# Patient Record
Sex: FEMALE | Race: WHITE
[De-identification: names, ages, dates, MRNs, and addresses within clinical notes are randomized per-mention and may not be internally consistent; named-entity substitution may affect disease eponyms.]

---

## 2018-01-17 ENCOUNTER — HOSPITAL ENCOUNTER (OUTPATIENT)
Dept: HOSPITAL 89 - US | Age: 83
End: 2018-01-17
Attending: PHYSICIAN ASSISTANT
Payer: MEDICARE

## 2018-01-17 VITALS — BODY MASS INDEX: 28.4 KG/M2

## 2018-01-17 DIAGNOSIS — Z90.49: ICD-10-CM

## 2018-01-17 DIAGNOSIS — K42.9: ICD-10-CM

## 2018-01-17 DIAGNOSIS — I70.8: ICD-10-CM

## 2018-01-17 DIAGNOSIS — L98.6: ICD-10-CM

## 2018-01-17 DIAGNOSIS — K44.9: Primary | ICD-10-CM

## 2018-01-17 DIAGNOSIS — N28.1: ICD-10-CM

## 2018-01-17 DIAGNOSIS — R93.3: ICD-10-CM

## 2018-01-17 DIAGNOSIS — Z90.710: ICD-10-CM

## 2018-01-17 DIAGNOSIS — M47.817: ICD-10-CM

## 2018-01-17 PROCEDURE — 74177 CT ABD & PELVIS W/CONTRAST: CPT

## 2018-01-17 PROCEDURE — 76705 ECHO EXAM OF ABDOMEN: CPT

## 2018-01-17 NOTE — RADIOLOGY IMAGING REPORT
FACILITY: [facility] - [place] 

 

PATIENT NAME: Vivienne Arrington

: 1930

MR: [id number]

V: [id number]

EXAM DATE: 

ORDERING PHYSICIAN: KUMAR VIVAS

TECHNOLOGIST: 

 

Location: [facility] [place]

Patient: Vivienne Arrington

: 1930

MRN: [id number]

Visit/Account:[id number]

Date of Sevice:  2018

 

ACCESSION #: [id number]

 

ABD SINGLE ORGAN/QUAD/FOLLOWUP

 

HISTORY:  Right lower quadrant pain rule out hernia at incision line closed umbilicus

 

COMPARISON:  CT and pelvis May 11, 2015

 

FINDINGS:

 

Multiple sonographic images of the right lower quadrant were submitted in location of patient's pain.
  The images were performed both with and without a Valsalva maneuver.  There was no demonstration of
 a ventral hernia.  No abnormal fluid collections identified in the subcutaneous soft tissues.  No so
lid mass is demonstrated in this location

 

IMPRESSION:

No sonographic abnormality identified along the intra-abdominal wall the right lower quadrant

 

Report Dictated By: Yakelin Tian MD at 2018 11:12 AM

 

Report E-Signed By: Yakelin Tian MD  at 2018 11:15 AM

 

WSN:[id number]

## 2018-01-17 NOTE — RADIOLOGY IMAGING REPORT
FACILITY: [facility] 

 

PATIENT NAME: Vivienne Arrington

: 1930

MR: [id number]

V: [id number]

EXAM DATE: 

ORDERING PHYSICIAN: KUMAR VIVAS

TECHNOLOGIST: 

 

Location: [facility] - [place]

Patient: Vivienne Arrington

: 1930

MRN: [id number]

Visit/Account:[id number]

Date of Sevice:  2018

 

ACCESSION #: [id number]

 

ABDOMEN/PELVIS WITH CONTRAST

 

HISTORY:  Right lower quadrant pain

 

TECHNIQUE: Following administration of IV contrast contiguous axial images acquired through the abdom
en/pelvis.  Coronal and sagittal reformatting also performed. Dose Lowering Technique

 

One of the following dose optimization techniques was utilized in the performance of this exam: Autom
ated exposure control; adjustment of the mA and/or kV according to the patient's size; or use of an i
terative  reconstruction technique.  Specific details can be referenced in the facility's radiology C
T exam operational policy.

 

 

 

CONTRAST:  75 mL Isovue-370

 

COMPARISON:  CT abdomen pelvis May 11, 2015

 

FINDINGS:

 

Visualized lung bases:  Negative.

 

Hepatobiliary:  There are postsurgical changes from a cholecystectomy.  There is a small area of decr
eased attenuation seen along the inferior liver adjacent to the gallbladder which appears some are to
 the prior study and likely represents an area of focal fatty infiltration

 

Spleen:  Negative.

 

Adrenals:  Negative.

 

Pancreas:  Negative.

 

Kidneys ureters or bladder: There Is a 3.1 cm cyst lower pole the right kidney.  Tiny hypodensity low
er pole of the left kidney is too small to characterize.  There is mild perinephric stranding bilater
ally.

 

Genitalia:  Hysterectomy

 

GI:  There are postsurgical changes of the sigmoid colon..  Post surgical changes also noted at the c
ecum  .  There is a small hiatal hernia

 

Vessels/spaces/nodes:  Extensive vascular calcifications in the abdomen and pelvis

 

Bones/soft tissues:  There is a very small umbilical hernia containing fat that appears unchanged whe
n compared to the prior study.  Calcifications are identified in the rectus sheath to the right of mi
dline above the level of the umbilicus.  Minimal infiltrative changes are seen subcutaneous fat  .

 

There are spondylotic changes of the lumbar spine including bulging discs from L1 to S1

 

Additional findings:  None pertinent.

 

IMPRESSION:

 

Is a tiny umbilical hernia containing fat that appears unchanged when compared the prior study.  Ther
e are minimal infiltrative changes seen in in the subcutaneous fat and calcifications in the rectus s
francesca just to the right and superior to the umbilicus.  This was present on the prior study with the 
infiltrative changes appearing much less prominent.

 

Postsurgical changes from a cholecystectomy and hysterectomy

 

Postsurgical changes of the sigmoid colon and cecum

 

Small hiatal hernia

 

 

 

Report Dictated By: Yakelin Tian MD at 2018 12:06 PM

 

Report E-Signed By: Yakelin Tian MD  at 2018 12:17 PM

 

WSN:[email or web address]

## 2018-03-13 ENCOUNTER — HOSPITAL ENCOUNTER (INPATIENT)
Dept: HOSPITAL 89 - ER | Age: 83
LOS: 5 days | Discharge: HOME | DRG: 390 | End: 2018-03-18
Attending: SURGERY | Admitting: SURGERY
Payer: MEDICARE

## 2018-03-13 ENCOUNTER — HOSPITAL ENCOUNTER (EMERGENCY)
Dept: HOSPITAL 89 - ER | Age: 83
Discharge: HOME | End: 2018-03-13
Payer: MEDICARE

## 2018-03-13 VITALS
WEIGHT: 170.37 LBS | HEIGHT: 65.5 IN | WEIGHT: 170.37 LBS | HEIGHT: 65.5 IN | BODY MASS INDEX: 28.04 KG/M2 | BODY MASS INDEX: 28.04 KG/M2

## 2018-03-13 VITALS — DIASTOLIC BLOOD PRESSURE: 72 MMHG | SYSTOLIC BLOOD PRESSURE: 140 MMHG

## 2018-03-13 VITALS — BODY MASS INDEX: 28.4 KG/M2 | WEIGHT: 170 LBS

## 2018-03-13 VITALS — DIASTOLIC BLOOD PRESSURE: 69 MMHG | SYSTOLIC BLOOD PRESSURE: 133 MMHG

## 2018-03-13 DIAGNOSIS — R10.84: Primary | ICD-10-CM

## 2018-03-13 DIAGNOSIS — Z90.49: ICD-10-CM

## 2018-03-13 DIAGNOSIS — Z88.8: ICD-10-CM

## 2018-03-13 DIAGNOSIS — Z90.710: ICD-10-CM

## 2018-03-13 DIAGNOSIS — E78.5: ICD-10-CM

## 2018-03-13 DIAGNOSIS — E03.9: ICD-10-CM

## 2018-03-13 DIAGNOSIS — Z87.891: ICD-10-CM

## 2018-03-13 DIAGNOSIS — Z85.828: ICD-10-CM

## 2018-03-13 DIAGNOSIS — Z85.3: ICD-10-CM

## 2018-03-13 DIAGNOSIS — Z88.0: ICD-10-CM

## 2018-03-13 DIAGNOSIS — I10: ICD-10-CM

## 2018-03-13 DIAGNOSIS — K56.50: Primary | ICD-10-CM

## 2018-03-13 LAB
INR PPP: 1.08
PLATELET COUNT, AUTOMATED: 209 K/UL (ref 150–450)
PLATELET COUNT, AUTOMATED: 231 K/UL (ref 150–450)

## 2018-03-13 PROCEDURE — 74250 X-RAY XM SM INT 1CNTRST STD: CPT

## 2018-03-13 PROCEDURE — 84075 ASSAY ALKALINE PHOSPHATASE: CPT

## 2018-03-13 PROCEDURE — 84520 ASSAY OF UREA NITROGEN: CPT

## 2018-03-13 PROCEDURE — 71046 X-RAY EXAM CHEST 2 VIEWS: CPT

## 2018-03-13 PROCEDURE — 84295 ASSAY OF SERUM SODIUM: CPT

## 2018-03-13 PROCEDURE — 84450 TRANSFERASE (AST) (SGOT): CPT

## 2018-03-13 PROCEDURE — 82435 ASSAY OF BLOOD CHLORIDE: CPT

## 2018-03-13 PROCEDURE — 36415 COLL VENOUS BLD VENIPUNCTURE: CPT

## 2018-03-13 PROCEDURE — 74177 CT ABD & PELVIS W/CONTRAST: CPT

## 2018-03-13 PROCEDURE — 82247 BILIRUBIN TOTAL: CPT

## 2018-03-13 PROCEDURE — 81001 URINALYSIS AUTO W/SCOPE: CPT

## 2018-03-13 PROCEDURE — 83690 ASSAY OF LIPASE: CPT

## 2018-03-13 PROCEDURE — 82150 ASSAY OF AMYLASE: CPT

## 2018-03-13 PROCEDURE — 85610 PROTHROMBIN TIME: CPT

## 2018-03-13 PROCEDURE — 82947 ASSAY GLUCOSE BLOOD QUANT: CPT

## 2018-03-13 PROCEDURE — 82310 ASSAY OF CALCIUM: CPT

## 2018-03-13 PROCEDURE — 96374 THER/PROPH/DIAG INJ IV PUSH: CPT

## 2018-03-13 PROCEDURE — 82040 ASSAY OF SERUM ALBUMIN: CPT

## 2018-03-13 PROCEDURE — 82374 ASSAY BLOOD CARBON DIOXIDE: CPT

## 2018-03-13 PROCEDURE — 84155 ASSAY OF PROTEIN SERUM: CPT

## 2018-03-13 PROCEDURE — 85025 COMPLETE CBC W/AUTO DIFF WBC: CPT

## 2018-03-13 PROCEDURE — 84132 ASSAY OF SERUM POTASSIUM: CPT

## 2018-03-13 PROCEDURE — 74022 RADEX COMPL AQT ABD SERIES: CPT

## 2018-03-13 PROCEDURE — 82565 ASSAY OF CREATININE: CPT

## 2018-03-13 PROCEDURE — 99285 EMERGENCY DEPT VISIT HI MDM: CPT

## 2018-03-13 PROCEDURE — 96360 HYDRATION IV INFUSION INIT: CPT

## 2018-03-13 PROCEDURE — 96375 TX/PRO/DX INJ NEW DRUG ADDON: CPT

## 2018-03-13 PROCEDURE — 84460 ALANINE AMINO (ALT) (SGPT): CPT

## 2018-03-13 PROCEDURE — 99284 EMERGENCY DEPT VISIT MOD MDM: CPT

## 2018-03-13 PROCEDURE — 83605 ASSAY OF LACTIC ACID: CPT

## 2018-03-13 PROCEDURE — 93005 ELECTROCARDIOGRAM TRACING: CPT

## 2018-03-13 PROCEDURE — 71045 X-RAY EXAM CHEST 1 VIEW: CPT

## 2018-03-13 PROCEDURE — 84484 ASSAY OF TROPONIN QUANT: CPT

## 2018-03-13 PROCEDURE — 74018 RADEX ABDOMEN 1 VIEW: CPT

## 2018-03-13 RX ADMIN — PREGABALIN SCH MG: 25 CAPSULE ORAL at 21:12

## 2018-03-13 RX ADMIN — ACETAMINOPHEN PRN MLS/HR: 10 INJECTION, SOLUTION INTRAVENOUS at 21:20

## 2018-03-13 RX ADMIN — THIAMINE HYDROCHLORIDE PRN MLS/HR: 100 INJECTION, SOLUTION INTRAMUSCULAR; INTRAVENOUS at 21:20

## 2018-03-13 RX ADMIN — ZOLPIDEM TARTRATE SCH MG: 5 TABLET, FILM COATED ORAL at 21:12

## 2018-03-13 NOTE — ER REPORT
History and Physical


Time Seen By MD:  14:38


Hx. of Stated Complaint:  


was here last night for n/v/flu like symptoms. back today for the same thing. 


had zofran around 1:30pm today


HPI/ROS


chief concern: nausea/vomiting





HPI: 88 y/o female presents with concern of nausea and vomiting x3 this 

morning. Seen in ED last night for stomach pain. Declined CT of abdomen. 

Returned today as she began vomiting "brown, rust-colored" fluid. Reports chills

/sweats. Reports bowel movements have been watery since gallbladder removal 

three years ago, denies bowel changes. Denies urinary changes, heartburn, 

shortness of breath, dyspnea, chest pain. 





Review of Systems:


HENT: Denies sore throat, heartburn


Respiratory: Denies shortness of breath, dyspnea


CV: Denies chest pain


GI: Reports nausea/vomiting x3 this AM. Describes emesis as "brown, rust-colored

". Denies diarrhea.


Allergies:  


Coded Allergies:  


     celecoxib (Verified  Allergy, Severe, TROUBLE BREATHING, 3/13/18)


     naproxen (Verified  Allergy, Severe, TROUBLE BREATHING, 3/13/18)


     nitrofurantoin (Verified  Allergy, Intermediate, SHORTNESS OF BREATH, 3/13/

18)


 ALSO CHILLS, SHAKING


     rofecoxib (Verified  Allergy, Intermediate, SHORTNESS OF BREATH, 3/13/18)


 ALSO ITCHING,


     Penicillins (Verified  Allergy, Unknown, ITCHING, 3/13/18)


     gabapentin (Verified  Allergy, Unknown, fainting, 3/13/18)


     levofloxacin (Verified  Allergy, Unknown, RASH, 3/13/18)


Home Meds


Reported Medications


Pregabalin (LYRICA) 100 Mg Capsule, 100 MG PO TID, CAPSULE


   3/13/18


Zolpidem Tartrate (AMBIEN) 5 Mg Tablet, 1 TAB PO QHS, TAB


   3/13/18


Amlodipine Besylate (AMLODIPINE BESYLATE) 5 Mg Tablet, 1 TAB PO QDAY, TAB


   3/13/18


Cyanocobalamin (Vitamin B-12) (Vitamin B12) 2,500 Mcg Tablet, 2000 UNITS INJ


   7/2/16


Levothyroxine Sodium (SYNTHROID) 75 Mcg Tablet, 75 MCG PO QDAY


   10/28/14


Discontinued Reported Medications


Methyldopa (METHYLDOPA) 500 Mg Tablet, 1000 MG PO BID


   10/1/15


Discontinued Scripts


Diazepam (DIAZEPAM) 5 Mg Tablet, 2.5-5 MG PO 2-3XD Y for DIZZINESS, #15 TAB


   Prov:SALVADOR CARRENO FNP         10/26/16


Ondansetron (ZOFRAN ODT) 4 Mg Tab.rapdis, 4 MG PO Q12H, #10 TAB


   TAKE 1 TABLET BY MOUTH EVERY 12 HOURS


   Prov:PURA DE LA FUENTE NP         7/13/16


Hydrocodone Bit/Acetaminophen (NORCO 5-325 TABLET) 1 Each Tablet, 1 EACH PO Q4-

6H Y for PAIN, #20 TAB


   Prov:SALVADOR CARRENO FNP         7/2/16


Past Medical/Surgical History


History of hysterectomy, cholecystectomy, bowel resection d/t diverticulitis 

2013; hospitalization for pancreatitis 2015; history of breast and skin cancer


Hypertension, hypercholesterolemia.


Reviewed Nurses Notes:  Yes


Old Medical Records Reviewed:  Yes


Hx Smoking:  Yes (SMOKED < 1 PPD FOR 'YEARS')


Smoking Status:  Former Smoker


Exposure to Second Hand Smoke?:  No


Hx Substance Use Disorder:  No


Hx Alcohol Use:  Yes


Constitutional





Vital Sign - Last 24 Hours








 3/13/18 3/13/18 3/13/18 3/13/18





 14:32 14:34 14:43 14:50


 


Temp 96.8   


 


Pulse 70  72 


 


Resp 16   


 


B/P (MAP) 138/56 138/56 (83)  


 


Pulse Ox 85  96 


 


O2 Delivery Room Air   


 


O2 Flow Rate    2.0


 


    





 3/13/18 3/13/18 3/13/18 3/13/18





 14:58 15:00 15:13 15:28


 


Pulse 69  68 69


 


Resp    29


 


B/P (MAP)  ???/??? (2255)  


 


Pulse Ox 97  94 97





 3/13/18 3/13/18 3/13/18 3/13/18





 15:30 15:43 15:56 15:58


 


Pulse  72  81


 


Resp  10  


 


B/P (MAP) ???/??? (0735)  138/75 (96) 


 


Pulse Ox  98  90





 3/13/18 3/13/18 3/13/18 3/13/18





 16:30 16:33 16:48 16:53


 


Pulse  80 84 74


 


Resp  30 21 20


 


B/P (MAP) 128/60 (82)   





 3/13/18 3/13/18 3/13/18 3/13/18





 17:00 17:08 17:23 17:30


 


Pulse  78 77 


 


Resp  22 17 


 


B/P (MAP) 121/71 (88)   142/71 (94)





 3/13/18 3/13/18 3/13/18 3/13/18





 17:38 17:53 17:58 18:00


 


Pulse 77 77 70 


 


Resp 19 20 22 


 


B/P (MAP)    122/58 (79)


 


Pulse Ox 94   





 3/13/18 3/13/18 3/13/18 3/13/18





 18:13 18:28 18:30 18:43


 


Pulse 84 76  73


 


Resp 37 12  35


 


B/P (MAP)   118/32 (60) 





 3/13/18 3/13/18 3/13/18 





 18:58 19:00 19:13 


 


Pulse 69  67 


 


Resp 14  19 


 


B/P (MAP)  120/57 (78)  














Intake and Output   


 


 3/13/18 3/13/18 3/14/18





 15:00 23:00 07:00


 


Intake Total  1000 ml 


 


Balance  1000 ml 








Physical Exam


Physical Exam:


General: Alert, oriented x3. Patient appears in some discomfort during exam. 


HENT: Posterior pharynx pink, no lymphadenopathy. 


Respiratory: Clear to auscultation bilaterally. No respiratory distress.


CV: Regular rate and rhythm. No peripheral edema.


GI: Hypoactive bowel sounds left quadrants. Bowel sounds normoactive right 

quadrants. Scattered sounds of tympany and dullness to percussion. Tenderness 

to palpation right quadrants, with palpable adhesions. No hepatosplenomegaly. 

Negative CVA tenderness. 





After obtaining thorough HPI and ROS, the following differentials were 

considered but not limited to: bowel obstruction, hepatitis, pancreatitis, MI, 

diverticulitis, and UTI.





Medical Decision Making


Data Points


Result Diagram:  


3/13/18 1446                                                                   

             3/13/18 1446





Laboratory





Hematology








Test


  3/13/18


14:46 3/13/18


16:01


 


Red Blood Count


  4.62 M/uL


(4.17-5.56) 


 


 


Mean Corpuscular Volume


  94.7 fL


(80.0-96.0) 


 


 


Mean Corpuscular Hemoglobin


  31.8 pg


(26.0-33.0) 


 


 


Mean Corpuscular Hemoglobin


Concent 33.5 g/dL


(32.0-36.0) 


 


 


Red Cell Distribution Width


  13.7 %


(11.5-14.5) 


 


 


Mean Platelet Volume


  8.4 fL


(7.2-11.1) 


 


 


Neutrophils (%) (Auto)


  70.3 %


(39.4-72.5) 


 


 


Lymphocytes (%) (Auto)


  23.3 %


(17.6-49.6) 


 


 


Monocytes (%) (Auto)


  5.2 %


(4.1-12.4) 


 


 


Eosinophils (%) (Auto)


  0.4 %


(0.4-6.7) 


 


 


Basophils (%) (Auto)


  0.8 %


(0.3-1.4) 


 


 


Nucleated RBC Relative Count


(auto) 0.1 /100WBC 


  


 


 


Neutrophils # (Auto)


  10.2 K/uL


(2.0-7.4) 


 


 


Lymphocytes # (Auto)


  3.4 K/uL


(1.3-3.6) 


 


 


Monocytes # (Auto)


  0.8 K/uL


(0.3-1.0) 


 


 


Eosinophils # (Auto)


  0.1 K/uL


(0.0-0.5) 


 


 


Basophils # (Auto)


  0.1 K/uL


(0.0-0.1) 


 


 


Nucleated RBC Absolute Count


(auto) 0.02 K/uL 


  


 


 


Peripheral Blood Smear Yes Y/N  


 


Prothrombin Time


  14.0 seconds


(12.0-14.4) 


 


 


Prothromb Time International


Ratio 1.08 


  


 


 


Sodium Level


  140 mmol/L


(137-145) 


 


 


Potassium Level


  4.8 mmol/L


(3.5-5.0) 


 


 


Chloride Level


  104 mmol/L


() 


 


 


Carbon Dioxide Level


  21 mmol/L


(22-31) 


 


 


Blood Urea Nitrogen


  23 mg/dl


(7-18) 


 


 


Creatinine


  1.10 mg/dl


(0.52-1.04) 


 


 


Glomerular Filtration Rate


Calc 47.0 


  


 


 


Random Glucose


  135 mg/dl


() 


 


 


Calcium Level


  9.1 mg/dl


(8.4-10.2) 


 


 


Total Bilirubin


  0.7 mg/dl


(0.2-1.3) 


 


 


Aspartate Amino Transf


(AST/SGOT) 33 U/L (0-35) 


  


 


 


Alanine Aminotransferase


(ALT/SGPT) 33 U/L (0-56) 


  


 


 


Alkaline Phosphatase 81 U/L (0-126)  


 


Troponin I < 0.012 ng/ml  


 


Total Protein


  7.4 gm/dl


(6.3-8.2) 


 


 


Albumin


  4.3 g/dl


(3.5-5.0) 


 


 


Amylase Level 74 U/L (0-110)  


 


Lipase


  44 U/L


() 


 


 


Urine Color  Yellow 


 


Urine Clarity


  


  Slightly-cloudy


 


 


Urine pH


  


  5.0 pH


(4.8-9.5)


 


Urine Specific Gravity  1.018 


 


Urine Protein


  


  Negative mg/dL


(NEGATIVE)


 


Urine Glucose (UA)


  


  Negative mg/dL


(NEGATIVE)


 


Urine Ketones


  


  Trace mg/dL


(NEGATIVE)


 


Urine Blood


  


  Negative


(NEGATIVE)


 


Urine Nitrite


  


  Negative


(NEGATIVE)


 


Urine Bilirubin


  


  Negative


(NEGATIVE)


 


Urine Urobilinogen


  


  Negative mg/dL


(0.2-1.9)


 


Urine Leukocyte Esterase


  


  Negative


(NEGATIVE)


 


Urine RBC


  


  None /HPF


(0-2/HPF)


 


Urine WBC


  


  1 /HPF


(0-5/HPF)


 


Urine Squamous Epithelial


Cells 


  Moderate /LPF


(</=FEW)


 


Urine Renal Epithelial Cells


  


  Few /LPF


(NONE-FEW)


 


Urine Bacteria


  


  Negative /HPF


(NONE-FEW)


 


Urine Hyaline Casts


  


  Few /LPF


(NONE-FEW)


 


Urine Mucus


  


  Few /HPF


(NONE-FEW)








Chemistry








Test


  3/13/18


14:46 3/13/18


16:01


 


White Blood Count


  14.5 k/uL


(4.5-11.0) 


 


 


Red Blood Count


  4.62 M/uL


(4.17-5.56) 


 


 


Hemoglobin


  14.7 g/dL


(12.0-16.0) 


 


 


Hematocrit


  43.8 %


(34.0-47.0) 


 


 


Mean Corpuscular Volume


  94.7 fL


(80.0-96.0) 


 


 


Mean Corpuscular Hemoglobin


  31.8 pg


(26.0-33.0) 


 


 


Mean Corpuscular Hemoglobin


Concent 33.5 g/dL


(32.0-36.0) 


 


 


Red Cell Distribution Width


  13.7 %


(11.5-14.5) 


 


 


Platelet Count


  231 K/uL


(150-450) 


 


 


Mean Platelet Volume


  8.4 fL


(7.2-11.1) 


 


 


Neutrophils (%) (Auto)


  70.3 %


(39.4-72.5) 


 


 


Lymphocytes (%) (Auto)


  23.3 %


(17.6-49.6) 


 


 


Monocytes (%) (Auto)


  5.2 %


(4.1-12.4) 


 


 


Eosinophils (%) (Auto)


  0.4 %


(0.4-6.7) 


 


 


Basophils (%) (Auto)


  0.8 %


(0.3-1.4) 


 


 


Nucleated RBC Relative Count


(auto) 0.1 /100WBC 


  


 


 


Neutrophils # (Auto)


  10.2 K/uL


(2.0-7.4) 


 


 


Lymphocytes # (Auto)


  3.4 K/uL


(1.3-3.6) 


 


 


Monocytes # (Auto)


  0.8 K/uL


(0.3-1.0) 


 


 


Eosinophils # (Auto)


  0.1 K/uL


(0.0-0.5) 


 


 


Basophils # (Auto)


  0.1 K/uL


(0.0-0.1) 


 


 


Nucleated RBC Absolute Count


(auto) 0.02 K/uL 


  


 


 


Peripheral Blood Smear Yes Y/N  


 


Prothrombin Time


  14.0 seconds


(12.0-14.4) 


 


 


Prothromb Time International


Ratio 1.08 


  


 


 


Glomerular Filtration Rate


Calc 47.0 


  


 


 


Calcium Level


  9.1 mg/dl


(8.4-10.2) 


 


 


Total Bilirubin


  0.7 mg/dl


(0.2-1.3) 


 


 


Aspartate Amino Transf


(AST/SGOT) 33 U/L (0-35) 


  


 


 


Alanine Aminotransferase


(ALT/SGPT) 33 U/L (0-56) 


  


 


 


Alkaline Phosphatase 81 U/L (0-126)  


 


Troponin I < 0.012 ng/ml  


 


Total Protein


  7.4 gm/dl


(6.3-8.2) 


 


 


Albumin


  4.3 g/dl


(3.5-5.0) 


 


 


Amylase Level 74 U/L (0-110)  


 


Lipase


  44 U/L


() 


 


 


Urine Color  Yellow 


 


Urine Clarity


  


  Slightly-cloudy


 


 


Urine pH


  


  5.0 pH


(4.8-9.5)


 


Urine Specific Gravity  1.018 


 


Urine Protein


  


  Negative mg/dL


(NEGATIVE)


 


Urine Glucose (UA)


  


  Negative mg/dL


(NEGATIVE)


 


Urine Ketones


  


  Trace mg/dL


(NEGATIVE)


 


Urine Blood


  


  Negative


(NEGATIVE)


 


Urine Nitrite


  


  Negative


(NEGATIVE)


 


Urine Bilirubin


  


  Negative


(NEGATIVE)


 


Urine Urobilinogen


  


  Negative mg/dL


(0.2-1.9)


 


Urine Leukocyte Esterase


  


  Negative


(NEGATIVE)


 


Urine RBC


  


  None /HPF


(0-2/HPF)


 


Urine WBC


  


  1 /HPF


(0-5/HPF)


 


Urine Squamous Epithelial


Cells 


  Moderate /LPF


(</=FEW)


 


Urine Renal Epithelial Cells


  


  Few /LPF


(NONE-FEW)


 


Urine Bacteria


  


  Negative /HPF


(NONE-FEW)


 


Urine Hyaline Casts


  


  Few /LPF


(NONE-FEW)


 


Urine Mucus


  


  Few /HPF


(NONE-FEW)








Coagulation








Test


  3/13/18


14:46


 


Prothrombin Time 14.0 seconds 


 


Prothromb Time International


Ratio 1.08 


 








Urinalysis








Test


  3/13/18


16:01


 


Urine Color Yellow 


 


Urine Clarity


  Slightly-cloudy


 


 


Urine pH


  5.0 pH


(4.8-9.5)


 


Urine Specific Gravity 1.018 


 


Urine Protein


  Negative mg/dL


(NEGATIVE)


 


Urine Glucose (UA)


  Negative mg/dL


(NEGATIVE)


 


Urine Ketones


  Trace mg/dL


(NEGATIVE)


 


Urine Blood


  Negative


(NEGATIVE)


 


Urine Nitrite


  Negative


(NEGATIVE)


 


Urine Bilirubin


  Negative


(NEGATIVE)


 


Urine Urobilinogen


  Negative mg/dL


(0.2-1.9)


 


Urine Leukocyte Esterase


  Negative


(NEGATIVE)


 


Urine RBC


  None /HPF


(0-2/HPF)


 


Urine WBC


  1 /HPF


(0-5/HPF)


 


Urine Squamous Epithelial


Cells Moderate /LPF


(</=FEW)


 


Urine Renal Epithelial Cells


  Few /LPF


(NONE-FEW)


 


Urine Bacteria


  Negative /HPF


(NONE-FEW)


 


Urine Hyaline Casts


  Few /LPF


(NONE-FEW)


 


Urine Mucus


  Few /HPF


(NONE-FEW)











EKG/Imaging


Monitor Interpretation:  Normal Sinus Rhythm (incomplete right bundle branch 

block)


Imaging


ABDOMEN/PELVIS WITH CONTRAST


 


Additional pertinent History:  Abdominal pain


 


TECHNIQUE:   Spiral scan was through the abdomen and pelvis during injection of 

nonionic iodinated intravenous contrast.


Contrast:   75 mL of IV Isovue-370.


 


COMPARISON STUDIES:   1/17/2018.


 


One of the following dose optimization techniques was utilized in the 

performance of this exam: Automated exposure control; adjustment of the mA and/

or kV according to the patient's size; or use of an iterative  reconstruction 

technique.  Specific details can be referenced in the facility's radiology CT 

exam operational policy.


 


FINDINGS:


Liver / biliary: Status post cholecystectomy


Pancreas: negative


Spleen: negative


Adrenal glands: negative


Kidneys / retroperitoneum: Simple appearing 3 cm cyst in the right kidney. 

Parapelvic cysts seen in the left kidney.


Pelvic  structures: Status post hysterectomy.


 


Bowel / peritoneum / mesenteries: Small bowel obstruction noted. Transition 

point noted in the anterior mid abdomen near a suture line. This is well seen 

on axial images 79 through 93. The most distal small bowel obstructive loop has 

fecal lysed material (image 86) there is a colonic enteric suture line seen in 

the descending colon. There is also a colonic enteric anastomotic line seen in 

the sigmoid colon. There are diverticuli without evidence of diverticulitis.


 


Vessels: negative


 


Musculoskeletal / Body wall: negative


 


Lymph node assessment: negative


 


Lower chest: negative


 


IMPRESSION:


Small bowel obstruction with transition point in the anterior mid abdomen at 

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 

near a suture line.


 


Results were discussed with SALVADOR CARRENO at 3/13/2018 5:12 PM.


 


Report Dictated By: John Khan MD at 3/13/2018 4:50 PM


 


Report E-Signed By: John Khan MD  at 3/13/2018 5:12 PM





CHEST PA AND LAT


 


INDICATION: Abdomen pain


 


COMPARISON: July 13, 2016


 


FINDINGS: Cholecystectomy clips and bilateral axillary clips noted.  The 

cardiac silhouette is normal in size.  No pneumothorax.  Minimal biapical 

pleural-parenchymal scarring.  Otherwise clear lungs.  No acute osseous 

abnormality.  No apparent free air.  No pleural fluid.


 


IMPRESSION:


 


No acute finding.


 


Report Dictated By: Magdy Martino MD at 3/13/2018 4:51 PM


 


Report E-Signed By: Magdy Martino MD  at 3/13/2018 4:52 PM





Portable chest, one view.


 


HISTORY: NG tube placement.


 


COMPARISON: 03/13/2018 at 0427 hours.


 


2 images were obtained. EKG leads and oxygen tubing project on the chest. A 

vague lucent line projects on the neck. The aortic knob is calcified. The heart 

and mediastinum are otherwise unremarkable.  Pulmonary vessels are 

unremarkable.  The lungs are clear. The pleural surfaces are unremarkable. No 

pneumothorax. Metal clips are present in the right upper abdomen and both 

axillae.


 


IMPRESSION:


 


The NG tube is not well-visualized.


 


Report Dictated By: Tho Parekh MD at 3/13/2018 7:10 PM


 


Report E-Signed By: Tho Parekh MD  at 3/13/2018 7:14 PM





ED Course/Re-evaluation


ED Course


Patient admitted to exam room. Thorough HPI and ROS obtained. 


Physical exam: 


Respiratory: Clear to auscultation bilaterally. No respiratory distress.


CV: Regular rate and rhythm. No peripheral edema.


GI: Hypoactive bowel sounds left quadrants. Bowel sounds normoactive right 

quadrants. Scattered sounds of tympany and dullness to percussion. Tenderness 

to palpation right quadrants, with palpable adhesions. No hepatosplenomegaly. 

Negative CVA tenderness. 





After obtaining thorough HPI and ROS, the following differentials were 

considered but not limited to: bowel obstruction, hepatitis, pancreatitis, MI, 

diverticulitis, and UTI. 


Labs obtained included CBC, CMP, UA, PT, troponin, 


Imaging: CXR, ABD CT


500ml normal saline administered.


CT IMPRESSION:


Small bowel obstruction with transition point in the anterior mid abdomen at 

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 

near a suture line.


CXR with no abnormal findings. 


Findings and need to admit to hospital discussed with patient.  NG tube placed 

without complications. Xray to verify placement. Patient verbalised 

understanding and agreed to plan.


Decision to Disposition Date:  Mar 13, 2018


Decision to Disposition Time:  18:00





Depart


Departure


Latest Vital Signs





Vital Signs








  Date Time  Temp Pulse Resp B/P (MAP) Pulse Ox O2 Delivery O2 Flow Rate FiO2


 


3/13/18 19:13  67 19     


 


3/13/18 19:00    120/57 (78)    


 


3/13/18 17:38     94   


 


3/13/18 14:50       2.0 


 


3/13/18 14:32 96.8     Room Air  








Impression:  


 Primary Impression:  


 Small bowel obstruction due to adhesions


Condition:  Condition Unchanged


Disposition:  Admitted from ER


Referrals:  


JUAN ANTOINE MD (PCP)











SALVADOR CARRENO Mar 13, 2018 14:58

## 2018-03-13 NOTE — RADIOLOGY IMAGING REPORT
FACILITY: [facility] - [place] 

 

PATIENT NAME: Vivienne Arrington

: 1930

MR: [id number]

V: [id number]

EXAM DATE: 

ORDERING PHYSICIAN: SALVADOR CARRENO

TECHNOLOGIST: 

 

Location: [facility]

Patient: Vivienne Arrington

: 1930

MRN: [id number]

Visit/Account:[id number]

Date of Sevice:  3/13/2018

 

ACCESSION #: [id number]

 

CHEST PA AND LAT

 

INDICATION: Abdomen pain

 

COMPARISON: 2016

 

FINDINGS: Cholecystectomy clips and bilateral axillary clips noted.  The cardiac silhouette is normal
 in size.  No pneumothorax.  Minimal biapical pleural-parenchymal scarring.  Otherwise clear lungs.  
No acute osseous abnormality.  No apparent free air.  No pleural fluid.

 

IMPRESSION:

 

No acute finding.

 

Report Dictated By: Magdy Martino MD at 3/13/2018 4:51 PM

 

Report E-Signed By: Magdy Martino MD  at 3/13/2018 4:52 PM

 

WSN:[id number]

## 2018-03-13 NOTE — RADIOLOGY IMAGING REPORT
FACILITY: [facility] - [place] 

 

PATIENT NAME: Vivienne Arrington

: 1930

MR: [id number]

V: [id number]

EXAM DATE: 

ORDERING PHYSICIAN: SALVADOR CARRENO

TECHNOLOGIST: 

 

Location[place] [facility] [place]

Patient: Vivienne Arrington

: 1930

MRN: [id number]

Visit/Account:[id number]

Date of Sevice:  3/13/2018

 

ACCESSION #: [id number]

 

Portable chest, one view.

 

HISTORY: NG tube placement.

 

COMPARISON: 2018 at 0427 hours.

 

2 images were obtained. EKG leads and oxygen tubing project on the chest. A vague lucent line project
s on the neck. The aortic knob is calcified. The heart and mediastinum are otherwise unremarkable.  P
ulmonary vessels are unremarkable.  The lungs are clear. The pleural surfaces are unremarkable. No pn
eumothorax. Metal clips are present in the right upper abdomen and both axillae.

 

IMPRESSION:

 

The NG tube is not well-visualized.

 

Report Dictated By: Tho Parekh MD at 3/13/2018 7:10 PM

 

Report E-Signed By: Tho Parekh MD  at 3/13/2018 7:14 PM

 

WSN:[id number]

## 2018-03-13 NOTE — RADIOLOGY IMAGING REPORT
FACILITY: [facility] - [place] 

 

PATIENT NAME: Vivienne Arrington

: 1930

MR: [id number]

V: [id number]

EXAM DATE: 

ORDERING PHYSICIAN: ROMELIA POST

TECHNOLOGIST: 

 

Location: [facility] [place]

Patient: Vivienne Arrington

: 1930

MRN: [id number]

Visit/Account:[id number]

Date of Sevice:  3/13/2018

 

ACCESSION #: [id number]

 

ACUTE ABDOMEN SERIES 3 VIEW

 

HISTORY:  Abdominal pain.

 

COMPARISON: CT abdomen and pelvis dated 2018.

 

FINDINGS: PA upright view of the chest, AP supine and AP upright views of the abdomen are obtained.

 

Lines/tubes:  None.

Bowel gas pattern:  No evidence of bowel obstruction or free air.

Soft tissues:  Right upper quadrant surgical clips. Pelvic phleboliths. Arterial calcifications. Bila
teral axillary surgical clips.

Bony structures:  Degenerative changes in the spine. Mild bilateral hip osteoarthritis.

Visualized lungs:  Negative.

 

IMPRESSION:

 

1. No evidence of bowel obstruction or free air.

 

2. No acute cardiopulmonary process.

 

Report Dictated By: Douglas Mcgowan MD at 3/13/2018 1:54 AM

 

Report E-Signed By: Douglas Mcgowan MD  at 3/13/2018 1:57 AM

 

WSN:[id number]

## 2018-03-13 NOTE — EKG
FACILITY: [facility] 

 

PATIENT NAME: LIONEL WINSTON

: [id number]

MR: [id number]

V: [id number]

EXAM DATE: 

ORDERING PHYSICIAN: SALVADOR CARRENO

TECHNOLOGIST: IAN

 

Test Reason : ABD PAIN

Blood Pressure : ***/*** mmHG

Vent. Rate : 064 BPM     Atrial Rate : 064 BPM

   P-R Int : 186 ms          QRS Dur : 092 ms

    QT Int : 426 ms       P-R-T Axes : 065 -12 083 degrees

   QTc Int : 439 ms

 

Normal sinus rhythm

Incomplete right bundle branch block

Borderline ECG

When compared with ECG of 2017 09:37,

No significant change was found

Confirmed by SWATHI BEASLEY ([id number]) on 3/13/2018 11:45:48 PM

 

Referred By:  EV           Confirmed By:SWATHI BEASLEY

## 2018-03-13 NOTE — RADIOLOGY IMAGING REPORT
FACILITY: [facility] 

 

PATIENT NAME: Vivienne Arrington

: 1930

MR: [id number]

V: [id number]

EXAM DATE: 

ORDERING PHYSICIAN: SALVADOR CARRENO

TECHNOLOGIST: 

 

Location: [facility] [place]

Patient: Vivienne Arrington

: 1930

MRN: [id number]

Visit/Account:[id number]

Date of Sevice:  3/13/2018

 

ACCESSION #: [id number]

 

ABDOMEN/PELVIS WITH CONTRAST

 

Additional pertinent History:  Abdominal pain

 

TECHNIQUE:   Spiral scan was through the abdomen and pelvis during injection of nonionic iodinated in
travenous contrast.

Contrast:   75 mL of IV Isovue-370.

 

COMPARISON STUDIES:   2018.

 

One of the following dose optimization techniques was utilized in the performance of this exam: Autom
ated exposure control; adjustment of the mA and/or kV according to the patient's size; or use of an i
terative  reconstruction technique.  Specific details can be referenced in the facility's radiology C
T exam operational policy.

 

FINDINGS:

Liver / biliary: Status post cholecystectomy

Pancreas: negative

Spleen: negative

Adrenal glands: negative

Kidneys / retroperitoneum: Simple appearing 3 cm cyst in the right kidney. Parapelvic cysts seen in t
he left kidney.

Pelvic  structures: Status post hysterectomy.

 

Bowel / peritoneum / mesenteries: Small bowel obstruction noted. Transition point noted in the anteri
or mid abdomen near a suture line. This is well seen on axial images 79 through 93. The most distal s
mall bowel obstructive loop has fecal lysed material (image 86) there is a colonic enteric suture connie
e seen in the descending colon. There is also a colonic enteric anastomotic line seen in the sigmoid 
colon. There are diverticuli without evidence of diverticulitis.

 

Vessels: negative

 

Musculoskeletal / Body wall: negative

 

Lymph node assessment: negative

 

Lower chest: negative

 

IMPRESSION:

Small bowel obstruction with transition point in the anterior mid abdomen at the point of probable HC
C small bowel up against the anterior abdominal wall near a suture line.

 

Results were discussed with SALVADOR CARRENO at 3/13/2018 5:12 PM.

 

Report Dictated By: John Khan MD at 3/13/2018 4:50 PM

 

Report E-Signed By: John Khan MD  at 3/13/2018 5:12 PM

 

WSN:[id number]

## 2018-03-13 NOTE — ER REPORT
History and Physical


Time Seen By MD:  01:03


Hx. of Stated Complaint:  


PT COMPLAINS OF STRONG "STOMACH PAINS" THAT RADIATE TO BACK ON BOTH SIDES.


HPI/ROS


CHIEF COMPLAINT: stomach pains





HISTORY OF PRESENT ILLNESS: This is an 87 year old female. She is having some 

stomach pain. Diffuse pain, started about 6pm tonight. She has no nausea or 

vomiting. She had no diarrhea. Had a bowel movement when the pain started. No 

blood in stool. No fevers or chills. Did eat a big meal tonight and thinks that 

maybe that was part of the problem. 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 

bowel obstruction in the past. Nothing makes the pain worse. Walking and 

standing do make the pain better. Has some radiation to the sides and back. She 

has a history of pancreatitis in the past, but this feels differently.





REVIEW OF SYSTEMS:


Constitutional: No fever or chills.


Eyes: No vision changes.


ENT: No sore throat. No congestion.


Cardiovascular: No chest pain. No palpitations.


Respiratory: No cough. No shortness of breath.


Gastrointestinal: As above.


Genitourinary: No dysuria.


Musculoskeletal: No extremity pain.


Skin: No rashes.


Neurological: No weakness. No headache.


Allergies:  


Coded Allergies:  


     celecoxib (Verified  Allergy, Severe, TROUBLE BREATHING, 3/13/18)


     naproxen (Verified  Allergy, Severe, TROUBLE BREATHING, 3/13/18)


     nitrofurantoin (Verified  Allergy, Intermediate, SHORTNESS OF BREATH, 3/13/

18)


 ALSO CHILLS, SHAKING


     rofecoxib (Verified  Allergy, Intermediate, SHORTNESS OF BREATH, 3/13/18)


 ALSO ITCHING,


     Penicillins (Verified  Allergy, Unknown, ITCHING, 3/13/18)


     gabapentin (Verified  Allergy, Unknown, fainting, 3/13/18)


     levofloxacin (Verified  Allergy, Unknown, RASH, 3/13/18)


Home Meds


Reported Medications


Pregabalin (LYRICA) 100 Mg Capsule, 100 MG PO TID, CAPSULE


   3/13/18


Zolpidem Tartrate (AMBIEN) 5 Mg Tablet, 1 TAB PO QHS, TAB


   3/13/18


Amlodipine Besylate (AMLODIPINE BESYLATE) 5 Mg Tablet, 1 TAB PO QDAY, TAB


   3/13/18


Cyanocobalamin (Vitamin B-12) (Vitamin B12) 2,500 Mcg Tablet, 2000 UNITS INJ


   7/2/16


Levothyroxine Sodium (SYNTHROID) 75 Mcg Tablet, 75 MCG PO QDAY


   10/28/14


Discontinued Reported Medications


Methyldopa (METHYLDOPA) 500 Mg Tablet, 1000 MG PO BID


   10/1/15


Discontinued Scripts


Diazepam (DIAZEPAM) 5 Mg Tablet, 2.5-5 MG PO 2-3XD Y for DIZZINESS, #15 TAB


   Prov:SALVADOR CARRENO FNP         10/26/16


Ondansetron (ZOFRAN ODT) 4 Mg Tab.rapdis, 4 MG PO Q12H, #10 TAB


   TAKE 1 TABLET BY MOUTH EVERY 12 HOURS


   Prov:PURA DE LA FUENTE NP         7/13/16


Hydrocodone Bit/Acetaminophen (NORCO 5-325 TABLET) 1 Each Tablet, 1 EACH PO Q4-

6H Y for PAIN, #20 TAB


   Prov:SALVADOR CARRENO FNCANDY         7/2/16


Reviewed Nurses Notes:  Yes


Hx Smoking:  Yes (SMOKED < 1 PPD FOR 'YEARS')


Smoking Status:  Former Smoker


Exposure to Second Hand Smoke?:  No


Hx Substance Use Disorder:  No


Hx Alcohol Use:  Yes


Constitutional





Vital Sign - Last 24 Hours








 3/13/18 3/13/18 3/13/18 3/13/18





 00:58 00:59 01:00 01:08


 


Temp  97.7  


 


Pulse  110  


 


Resp  16  


 


B/P (MAP) 154/75 (101) 154/75 141/84 (103) 


 


Pulse Ox  91  89


 


O2 Delivery  Room Air  


 


    





 3/13/18 3/13/18 3/13/18 3/13/18





 01:23 01:53 01:58 02:00


 


Pulse 80 75 73 


 


B/P (MAP)    140/72 (94)


 


Pulse Ox 92 94 94 





 3/13/18   





 02:13   


 


Pulse 80   


 


Pulse Ox 90   








Physical Exam


   General Appearance: The patient is alert. Having mild acute distress due to 

pain.


Eyes: Pupils are equal, round. No pallor, injection or icterus.


ENT: Mucous membranes are moist. Normal oral mucosa. Posterior oropharynx is 

normal.


Neck: Supple and non tender. No lymphadenopathy.


Respiratory: Lungs are clear to auscultation.


Cardiovascular: Regular rate and rhythm. No murmurs, gallops or rubs. Normal 

capillary refill.


Gastrointestinal: Abdomen is soft, diffuse discomfort, no focal pain. 

Nondistended. No rebound or guarding. Hyperactive bowel sounds. No 

costovertebral angle tenderness with percussion.


Neurological: Alert and oriented x3.


Skin: Warm and dry.





DIFFERENTIAL DIAGNOSIS: After history and physical exam, differential diagnosis 

was considered for abdominal pain including but not limited to appendicitis, 

enteritis, gastritis, pancreatitis, indigestion, bowel obstruction and urinary 

tract infection.





Medical Decision Making


Data Points


Result Diagram:  


3/13/18 0120                                                                   

             3/13/18 0120





Laboratory





Hematology








Test


  3/13/18


01:20


 


Red Blood Count


  4.65 M/uL


(4.17-5.56)


 


Mean Corpuscular Volume


  94.6 fL


(80.0-96.0)


 


Mean Corpuscular Hemoglobin


  31.6 pg


(26.0-33.0)


 


Mean Corpuscular Hemoglobin


Concent 33.4 g/dL


(32.0-36.0)


 


Red Cell Distribution Width


  13.4 %


(11.5-14.5)


 


Mean Platelet Volume


  8.0 fL


(7.2-11.1)


 


Neutrophils (%) (Auto)


  54.6 %


(39.4-72.5)


 


Lymphocytes (%) (Auto)


  37.5 %


(17.6-49.6)


 


Monocytes (%) (Auto)


  6.4 %


(4.1-12.4)


 


Eosinophils (%) (Auto)


  1.2 %


(0.4-6.7)


 


Basophils (%) (Auto)


  0.3 %


(0.3-1.4)


 


Nucleated RBC Relative Count


(auto) 0.1 /100WBC 


 


 


Neutrophils # (Auto)


  7.4 K/uL


(2.0-7.4)


 


Lymphocytes # (Auto)


  5.0 K/uL


(1.3-3.6)


 


Monocytes # (Auto)


  0.9 K/uL


(0.3-1.0)


 


Eosinophils # (Auto)


  0.2 K/uL


(0.0-0.5)


 


Basophils # (Auto)


  0.0 K/uL


(0.0-0.1)


 


Nucleated RBC Absolute Count


(auto) 0.01 K/uL 


 


 


Sodium Level


  140 mmol/L


(137-145)


 


Potassium Level


  4.0 mmol/L


(3.5-5.0)


 


Chloride Level


  106 mmol/L


()


 


Carbon Dioxide Level


  20 mmol/L


(22-31)


 


Blood Urea Nitrogen


  19 mg/dl


(7-18)


 


Creatinine


  1.10 mg/dl


(0.52-1.04)


 


Glomerular Filtration Rate


Calc 47.0 


 


 


Random Glucose


  127 mg/dl


()


 


Lactate


  1.6 mmol/L


(0.7-2.1)


 


Calcium Level


  9.4 mg/dl


(8.4-10.2)


 


Total Bilirubin


  0.4 mg/dl


(0.2-1.3)


 


Aspartate Amino Transf


(AST/SGOT) 20 U/L (0-35) 


 


 


Alanine Aminotransferase


(ALT/SGPT) 33 U/L (0-56) 


 


 


Alkaline Phosphatase 73 U/L (0-126) 


 


Total Protein


  6.7 gm/dl


(6.3-8.2)


 


Albumin


  4.0 g/dl


(3.5-5.0)


 


Amylase Level 47 U/L (0-110) 


 


Lipase


  57 U/L


()








Chemistry








Test


  3/13/18


01:20


 


White Blood Count


  13.5 k/uL


(4.5-11.0)


 


Red Blood Count


  4.65 M/uL


(4.17-5.56)


 


Hemoglobin


  14.7 g/dL


(12.0-16.0)


 


Hematocrit


  44.0 %


(34.0-47.0)


 


Mean Corpuscular Volume


  94.6 fL


(80.0-96.0)


 


Mean Corpuscular Hemoglobin


  31.6 pg


(26.0-33.0)


 


Mean Corpuscular Hemoglobin


Concent 33.4 g/dL


(32.0-36.0)


 


Red Cell Distribution Width


  13.4 %


(11.5-14.5)


 


Platelet Count


  209 K/uL


(150-450)


 


Mean Platelet Volume


  8.0 fL


(7.2-11.1)


 


Neutrophils (%) (Auto)


  54.6 %


(39.4-72.5)


 


Lymphocytes (%) (Auto)


  37.5 %


(17.6-49.6)


 


Monocytes (%) (Auto)


  6.4 %


(4.1-12.4)


 


Eosinophils (%) (Auto)


  1.2 %


(0.4-6.7)


 


Basophils (%) (Auto)


  0.3 %


(0.3-1.4)


 


Nucleated RBC Relative Count


(auto) 0.1 /100WBC 


 


 


Neutrophils # (Auto)


  7.4 K/uL


(2.0-7.4)


 


Lymphocytes # (Auto)


  5.0 K/uL


(1.3-3.6)


 


Monocytes # (Auto)


  0.9 K/uL


(0.3-1.0)


 


Eosinophils # (Auto)


  0.2 K/uL


(0.0-0.5)


 


Basophils # (Auto)


  0.0 K/uL


(0.0-0.1)


 


Nucleated RBC Absolute Count


(auto) 0.01 K/uL 


 


 


Glomerular Filtration Rate


Calc 47.0 


 


 


Lactate


  1.6 mmol/L


(0.7-2.1)


 


Calcium Level


  9.4 mg/dl


(8.4-10.2)


 


Total Bilirubin


  0.4 mg/dl


(0.2-1.3)


 


Aspartate Amino Transf


(AST/SGOT) 20 U/L (0-35) 


 


 


Alanine Aminotransferase


(ALT/SGPT) 33 U/L (0-56) 


 


 


Alkaline Phosphatase 73 U/L (0-126) 


 


Total Protein


  6.7 gm/dl


(6.3-8.2)


 


Albumin


  4.0 g/dl


(3.5-5.0)


 


Amylase Level 47 U/L (0-110) 


 


Lipase


  57 U/L


()











EKG/Imaging


Imaging


ACUTE ABDOMEN SERIES 3 VIEW


 


HISTORY:  Abdominal pain.


 


COMPARISON: CT abdomen and pelvis dated 1/17/2018.


 


FINDINGS: PA upright view of the chest, AP supine and AP upright views of the 

abdomen are obtained.


 


Lines/tubes:  None.


Bowel gas pattern:  No evidence of bowel obstruction or free air.


Soft tissues:  Right upper quadrant surgical clips. Pelvic phleboliths. 

Arterial calcifications. Bilateral axillary surgical clips.


Bony structures:  Degenerative changes in the spine. Mild bilateral hip 

osteoarthritis.


Visualized lungs:  Negative.


 


IMPRESSION:


 


1. No evidence of bowel obstruction or free air.


 


2. No acute cardiopulmonary process.


 


Report Dictated By: Douglas Mcgowan MD at 3/13/2018 1:54 AM





ED Course/Re-evaluation


Clinical Indication for ER IV:  IV Access


ED Course


After my initial evaluation, suggested doing a CT scan, but the patient did not 

want a CT as she had one a couple of weeks ago. Has seen Dr. Us recently 

for evaluation for a hernia, and had no problems. She agreed to do blood and 

urine testing and a urinalysis. We did an abdominal 3 view, which she was 

alright with. Pain was treated with 2mg of IV Morphine and 4mg of IV Zofran. 

She is feeling much better now. Pain is mostly gone. Labs showed slight changes 

in her kidney function. Slight elevation of white blood cell count. Imaging 

with no signs of obstruction with a nonspecific bowel gas pattern. Discussed 

all of this with the patient. After our discussion, I offered further testing 

or tying some pain medicines at home to see how she does. She would prefer to 

go home with pain medicine and return if things are worsening and only do more 

testing at that time.


Decision to Disposition Date:  Mar 13, 2018


Decision to Disposition Time:  02:28





Depart


Departure


Latest Vital Signs





Vital Signs








  Date Time  Temp Pulse Resp B/P (MAP) Pulse Ox O2 Delivery O2 Flow Rate FiO2


 


3/13/18 02:13  80   90   


 


3/13/18 02:00    140/72 (94)    


 


3/13/18 00:59 97.7  16   Room Air  








Impression:  


 Primary Impression:  


 Abdominal pain


Condition:  Improved


Disposition:  HOME OR SELF-CARE


Referrals:  


JUAN ANTOINE MD (PCP)


Patient Instructions:  Acute Abdominal Pain (ED)





Additional Instructions:  


We did not find any signs of bowel obstruction.


Your labs are looking alright. You have some mild changes to your kidney 

function and need to increase fluid intake.


No signs of pancreatitis.


White count slightly elevated, but normal types of white cells in the 

differential.


Pain has improved.


If you have worsening pain, fevers/chills, or nausea/vomiting, please return to 

the ER for re-evaluation.


You can use Zofran 4mg, one every 6 hours as needed for nausea.


You can use Lortab 5/325, one every 4 hours as needed for pain.





Problem Qualifiers








 Primary Impression:  


 Abdominal pain


 Abdominal location:  generalized  Qualified Codes:  R10.84 - Generalized 

abdominal pain








ROMELIA POST MD Mar 13, 2018 01:03

## 2018-03-13 NOTE — RADIOLOGY IMAGING REPORT
FACILITY: [facility] [place] 

 

PATIENT NAME: Viveinne Arrington

: 1930

MR: [id number]

V: [id number]

EXAM DATE: 

ORDERING PHYSICIAN: JOHN ULLRICH

TECHNOLOGIST: 

 

Location: [facility] - [place]

Patient: Vivienne Arrington

: 1930

MRN: [id number]

Visit/Account:[id number]

Date of Sevice:  3/13/2018

 

ACCESSION #: [id number]

 

KUB SINGLE VIEW ABDOMEN

 

HISTORY:  Line placement

 

COMPARISON: Chest x-ray dated 3/13/2018 at 6:25 PM.

 

FINDINGS: A single AP supine view of the abdomen is obtained.

 

Lines/tubes:  The enteric tube terminates in the distal esophagus.

Bowel gas pattern:  Normal.

Soft tissues:  Surgical clips in the bilateral axilla and right upper quadrant.

Bony structures:  Negative.

Visualized lung bases:  Negative.

 

IMPRESSION:

 

The enteric tube terminates in the distal esophagus. Advancement by approximately 12 cm is recommende
d.

 

Results were called to Yovanny Fowler at 3/13/2018 8:54 PM.

 

Report Dictated By: Douglas Mcgowan MD at 3/13/2018 8:53 PM

 

Report E-Signed By: Douglas Mcgowan MD  at 3/13/2018 8:58 PM

 

WSN:[id number]

## 2018-03-14 VITALS — SYSTOLIC BLOOD PRESSURE: 108 MMHG | DIASTOLIC BLOOD PRESSURE: 58 MMHG

## 2018-03-14 VITALS — DIASTOLIC BLOOD PRESSURE: 42 MMHG | SYSTOLIC BLOOD PRESSURE: 118 MMHG

## 2018-03-14 VITALS — SYSTOLIC BLOOD PRESSURE: 119 MMHG | DIASTOLIC BLOOD PRESSURE: 82 MMHG

## 2018-03-14 VITALS — SYSTOLIC BLOOD PRESSURE: 125 MMHG | DIASTOLIC BLOOD PRESSURE: 60 MMHG

## 2018-03-14 VITALS — SYSTOLIC BLOOD PRESSURE: 132 MMHG | DIASTOLIC BLOOD PRESSURE: 61 MMHG

## 2018-03-14 LAB — PLATELET COUNT, AUTOMATED: 216 K/UL (ref 150–450)

## 2018-03-14 PROCEDURE — 0D9670Z DRAINAGE OF STOMACH WITH DRAINAGE DEVICE, VIA NATURAL OR ARTIFICIAL OPENING: ICD-10-PCS | Performed by: SURGERY

## 2018-03-14 RX ADMIN — PREGABALIN SCH MG: 25 CAPSULE ORAL at 21:03

## 2018-03-14 RX ADMIN — LEVOTHYROXINE SODIUM SCH MG: 75 TABLET ORAL at 05:00

## 2018-03-14 RX ADMIN — AMLODIPINE BESYLATE SCH MG: 5 TABLET ORAL at 21:04

## 2018-03-14 RX ADMIN — PANTOPRAZOLE SODIUM SCH MG: 40 INJECTION, POWDER, FOR SOLUTION INTRAVENOUS at 10:00

## 2018-03-14 RX ADMIN — ENOXAPARIN SODIUM SCH MG: 100 INJECTION SUBCUTANEOUS at 09:00

## 2018-03-14 RX ADMIN — PREGABALIN SCH MG: 25 CAPSULE ORAL at 14:52

## 2018-03-14 RX ADMIN — PREGABALIN SCH MG: 25 CAPSULE ORAL at 10:00

## 2018-03-14 RX ADMIN — ACETAMINOPHEN PRN MLS/HR: 10 INJECTION, SOLUTION INTRAVENOUS at 21:09

## 2018-03-14 RX ADMIN — ZOLPIDEM TARTRATE SCH MG: 5 TABLET, FILM COATED ORAL at 21:00

## 2018-03-14 RX ADMIN — ONDANSETRON HYDROCHLORIDE PRN MG: 2 INJECTION, SOLUTION INTRAMUSCULAR; INTRAVENOUS at 06:32

## 2018-03-14 RX ADMIN — ACETAMINOPHEN PRN MLS/HR: 10 INJECTION, SOLUTION INTRAVENOUS at 14:58

## 2018-03-14 NOTE — GENERAL SURGERY PROGRESS NOTE
Subjective


Progress Notes


Subjective


Feeling nauseated this morning.  No abdominal pain.  Unable to place NG last 

night, unable to advance tube into stomach and patient eventually refused 

further efforts due to significant discomfort.  No flatus or BM.





Physical Exam





Vital Signs








  Date Time  Temp Pulse Resp B/P (MAP) Pulse Ox O2 Delivery O2 Flow Rate FiO2


 


3/13/18 20:26     94 Nasal Cannula 2.0 


 


3/13/18 19:45 97.9 71 16 133/69 (90)    








General Appearance:  Alert, Awake, No Acute Distress, Afebrile


GI:  Other (Soft, mildly distended, mild TTP to right of midline)


Extremities:  Warm, Perfused


Result Diagram:  


3/14/18 0601                                                                   

             3/14/18 0601





Monitor Interpretation:  Normal Sinus Rhythm (incomplete right bundle branch 

block)





Assessment and Plan


Problems:  


(1) Small bowel obstruction due to adhesions


Status:  Acute


Assessment & Plan:  3/13/18:  Admit, NPO, NG tube decompression, IV fluids.  

Will start with conservative therapy.  If she fails to improve over the next 2-

3 days, she may require surgical exploration to determine the cause of the SBO 

and address it surgically.





3/14/18:  Persistent SBO with nausea this morning.  Will reattempt NG placement 

today with topical analgesics.  May need to place endoscopically if unable to 

place NG tube at bedside.  Would like to make every effort to avoid surgery if 

possible in this 86yo female with numerous major abdominal surgeries.





Central Venous Access


Medical Necessity for Access:  IV Access, Medication Administration


Condition


STable.


Time Spent:  < 30 min





Exam


Sepsis Risk:  No Definite Risk











ULLRICH,JOHN A MD Mar 14, 2018 07:23

## 2018-03-14 NOTE — MEDICAL NUTRITION THERAPY
Nutrition Anthropometrics


Weight (Pounds):  170


Weight (Calculated Kilograms):  77.281


BMI Calculated:  28.40


Hernando Nutrition Score:         Probably Inadequate 


Hernando Nutrition Risk Score:  16


Dietary Referral


Nutrition Risk Factors:      


Nutrition Risk Comment:





Physical Findings


Physical Appearance:  Ht not available to calculate Wt status


Skin Appearance


Skin Appearance:


Edema


Edema Location Modifier:  


Edema Location:               


Type of Edema:                 


Degree of Edema:


Gastrointestinal Symptoms


GI Symtoms:                Nausea, Vomiting, Change in Bowel Pattern 


Tube Present:              NG 


Bowel Sounds:              


Recent Bowel Pattern:   Constipated 


Stool Characteristics:





Nutrition/Food History


N/V





Nutritional Diagnosis


Nutritional Risk Acuity 1:  GI Obstruction


Past Medical History:  


hypothyroid, HTN, hyperlipidemia


Nutritional Acuity:  1-High


Nutrition Diagnosis:  Altered GI Function


Nutrition Etiology:  Physiological Causes


Nutrition Problem/Etiology/Sym:  


Altered Gastrointestinal (GI) Function related to alteration in


gastrointestinal tract function secondary to SBO AEB positive SBO


diagnosis with CT of the abdomen and abdominal pain, nausea prior to admit.


Protein Requirement:  77


Fluid Requirement:  2300


Diet Type:  NPO/Meds Only


Nutrition Intervention:  Incr diet as tolerated





Nutrition Monitoring & Eval


Nutrition Goals:  Eat % Meal


RD Patient Assessment Time:  45 minutes


RD Assessment Type:  RD Assessment


Patient Nutrition Acuity:  1-High


Follow Up Date:  Mar 16, 2018


Nutritional Comment:  


Pt admitted for SBO.  Alb 4.3.  NPO at present.  Follow clinical  


progression, labs, etc.











VIVIENNE ELY Mar 14, 2018 16:32

## 2018-03-14 NOTE — RADIOLOGY IMAGING REPORT
FACILITY: [facility] 

 

PATIENT NAME: Vivienne Arrington

: 1930

MR: [id number]

V: [id number]

EXAM DATE: 

ORDERING PHYSICIAN: JOHN ULLRICH

TECHNOLOGIST: 

 

[place] [facility] [place]

Patient: Vivienne Arrington

: 1930

MRN: [id number]

Visit/Account:[id number]

Date of Sevice:  3/14/2018

 

ACCESSION #: [id number]

 

Exam type: KUB SINGLE VIEW ABDOMEN

 

Indication: Small bowel obstruction. Right upper quadrant pain.

 

Comparison: 2018 at.

 

Findings:

Bowel gas seen throughout the abdomen in a nonobstructive pattern.

There are no pathologic calcifications identified.

 

 

IMPRESSION:

No acute findings.

 

Report Dictated By: Magdy Martinez MD at 3/14/2018 7:17 AM

 

Report E-Signed By: Magdy Martinez MD  at 3/14/2018 7:19 AM

 

WSN:[id number]

## 2018-03-14 NOTE — RADIOLOGY IMAGING REPORT
FACILITY: [facility] - [place] 

 

PATIENT NAME: Vivienne Arrington

: 1930

MR: [id number]

V: [id number]

EXAM DATE: 

ORDERING PHYSICIAN: JOHN ULLRICH

TECHNOLOGIST: 

 

Location: [facility] - [place]

Patient: Vivienne Arrington

: 1930

MRN: [id number]

Visit/Account:[id number]

Date of Sevice:  3/14/2018

 

ACCESSION #: [id number]

 

Exam type: CHEST SINGLE AP

 

History: NG tube placement

 

Comparison: 2018.

 

Findings:

 

The NG/OG tube has been repositioned the distal tip now projecting over the left upper quadrant.  The
 lungs are free of consolidation.  There is no evidence of pleural effusions or pulmonary edema.  The
 cardiac silhouette is normal in size.

There are surgical clips in the bilateral axillary regions

IMPRESSION:

 

1.  NG tube is been repositioned with the distal tip projecting over the left upper quadrant of abdom
en

 

No evidence of pulmonary consolidation

 

Report Dictated By: Yakelin Tian MD at 3/14/2018 8:37 AM

 

Report E-Signed By: Yakelin Tian MD  at 3/14/2018 8:39 AM

 

[id number]

## 2018-03-15 VITALS — SYSTOLIC BLOOD PRESSURE: 139 MMHG | DIASTOLIC BLOOD PRESSURE: 65 MMHG

## 2018-03-15 VITALS — DIASTOLIC BLOOD PRESSURE: 84 MMHG | SYSTOLIC BLOOD PRESSURE: 133 MMHG

## 2018-03-15 VITALS — SYSTOLIC BLOOD PRESSURE: 128 MMHG | DIASTOLIC BLOOD PRESSURE: 59 MMHG

## 2018-03-15 VITALS — SYSTOLIC BLOOD PRESSURE: 100 MMHG | DIASTOLIC BLOOD PRESSURE: 49 MMHG

## 2018-03-15 VITALS — SYSTOLIC BLOOD PRESSURE: 112 MMHG | DIASTOLIC BLOOD PRESSURE: 53 MMHG

## 2018-03-15 LAB — PLATELET COUNT, AUTOMATED: 179 K/UL (ref 150–450)

## 2018-03-15 RX ADMIN — AMLODIPINE BESYLATE SCH MG: 5 TABLET ORAL at 22:13

## 2018-03-15 RX ADMIN — PREGABALIN SCH MG: 25 CAPSULE ORAL at 14:00

## 2018-03-15 RX ADMIN — THIAMINE HYDROCHLORIDE PRN MLS/HR: 100 INJECTION, SOLUTION INTRAMUSCULAR; INTRAVENOUS at 17:27

## 2018-03-15 RX ADMIN — ACETAMINOPHEN PRN MLS/HR: 10 INJECTION, SOLUTION INTRAVENOUS at 17:44

## 2018-03-15 RX ADMIN — ONDANSETRON HYDROCHLORIDE PRN MG: 2 INJECTION, SOLUTION INTRAMUSCULAR; INTRAVENOUS at 10:04

## 2018-03-15 RX ADMIN — LEVOTHYROXINE SODIUM SCH MG: 75 TABLET ORAL at 05:51

## 2018-03-15 RX ADMIN — ACETAMINOPHEN PRN MLS/HR: 10 INJECTION, SOLUTION INTRAVENOUS at 04:19

## 2018-03-15 RX ADMIN — PREGABALIN SCH MG: 25 CAPSULE ORAL at 22:13

## 2018-03-15 RX ADMIN — ZOLPIDEM TARTRATE SCH MG: 5 TABLET, FILM COATED ORAL at 22:14

## 2018-03-15 RX ADMIN — PREGABALIN SCH MG: 25 CAPSULE ORAL at 08:36

## 2018-03-15 RX ADMIN — BENZOCAINE AND MENTHOL PRN EACH: 15; 3.6 LOZENGE ORAL at 19:52

## 2018-03-15 RX ADMIN — PANTOPRAZOLE SODIUM SCH MG: 40 INJECTION, POWDER, FOR SOLUTION INTRAVENOUS at 08:36

## 2018-03-15 RX ADMIN — ENOXAPARIN SODIUM SCH MG: 100 INJECTION SUBCUTANEOUS at 08:36

## 2018-03-15 RX ADMIN — THIAMINE HYDROCHLORIDE PRN MLS/HR: 100 INJECTION, SOLUTION INTRAMUSCULAR; INTRAVENOUS at 04:57

## 2018-03-15 NOTE — RADIOLOGY IMAGING REPORT
FACILITY: [facility] - [place] 

 

PATIENT NAME: Vivienne Arrington

: 1930

MR: [id number]

V: [id number]

EXAM DATE: 

ORDERING PHYSICIAN: JOHN ULLRICH

TECHNOLOGIST: 

 

Location: [facility] - [place]

Patient: Vivienne Arrington

: 1930

MRN: [id number]

Visit/Account:[id number]

Date of Sevice:  3/15/2018

 

ACCESSION #: [id number]

 

Single view of the abdomen

 

Indication: Small bowel obstruction

 

Comparison: 2018

 

Findings:

Prominent loop of small bowel within the left upper quadrant measuring up to 3.5 cm, previously 2.8 c
m.

There are no pathologic calcifications identified.

 

 

IMPRESSION:

Prominent loops of small bowel within the left upper quadrant measuring up to 3.5 cm, increased since
 prior exam.

 

Report Dictated By: Magdy Martinez MD at 3/15/2018 6:02 AM

 

Report E-Signed By: Magdy Martinez MD  at 3/15/2018 6:03 AM

 

WSN:[id number]

## 2018-03-15 NOTE — GENERAL SURGERY PROGRESS NOTE
Subjective


Progress Notes


Subjective


Only complaint this morning is that she didn't sleep well overnight.  No pain.  

Passed "a little" flatus.  No N/V.





Physical Exam





Vital Signs








  Date Time  Temp Pulse Resp B/P (MAP) Pulse Ox O2 Delivery O2 Flow Rate FiO2


 


3/15/18 04:57   16  94 Blow-by 4.0 


 


3/15/18 03:18    100/49 (66)    


 


3/14/18 23:21  70      


 


3/14/18 19:12 97.5       








General Appearance:  Alert, Awake, No Acute Distress, Afebrile


GI:  Other (Soft, mildly tender to right of umbilicus, no peritoneal signs, no 

palpable mass or bulge.  Upper abdomen is a little tympanic to percussion)


Extremities:  Warm, Perfused


Result Diagram:  


3/15/18 0558                                                                   

             3/15/18 0558





Monitor Interpretation:  Normal Sinus Rhythm (incomplete right bundle branch 

block)





Assessment and Plan


Problems:  


(1) Small bowel obstruction due to adhesions


Status:  Acute


Assessment & Plan:  3/13/18:  Admit, NPO, NG tube decompression, IV fluids.  

Will start with conservative therapy.  If she fails to improve over the next 2-

3 days, she may require surgical exploration to determine the cause of the SBO 

and address it surgically.





3/14/18:  Persistent SBO with nausea this morning.  Will reattempt NG placement 

today with topical analgesics.  May need to place endoscopically if unable to 

place NG tube at bedside.  Would like to make every effort to avoid surgery if 

possible in this 86yo female with numerous major abdominal surgeries.





3/14/18 (evening):  NG passed successfully this morning.  It has put out 650mL 

of bilious fluid.  No flatus yet.





3/15/18:  Passed some flatus but KUB with a slightly increased dilated small 

bowel in RUQ.  NG still with bilious output.  Will get water-soluble SBFT today.





Central Venous Access


Medical Necessity for Access:  IV Access, Medication Administration


Condition


Stable.


Time Spent:  < 30 min





Exam


Sepsis Risk:  No Definite Risk











ULLRICH,JOHN A MD Mar 15, 2018 07:05

## 2018-03-16 VITALS — SYSTOLIC BLOOD PRESSURE: 133 MMHG | DIASTOLIC BLOOD PRESSURE: 70 MMHG

## 2018-03-16 VITALS — SYSTOLIC BLOOD PRESSURE: 122 MMHG | DIASTOLIC BLOOD PRESSURE: 52 MMHG

## 2018-03-16 VITALS — SYSTOLIC BLOOD PRESSURE: 130 MMHG | DIASTOLIC BLOOD PRESSURE: 60 MMHG

## 2018-03-16 VITALS — DIASTOLIC BLOOD PRESSURE: 61 MMHG | SYSTOLIC BLOOD PRESSURE: 123 MMHG

## 2018-03-16 VITALS — DIASTOLIC BLOOD PRESSURE: 67 MMHG | SYSTOLIC BLOOD PRESSURE: 133 MMHG

## 2018-03-16 LAB — PLATELET COUNT, AUTOMATED: 181 K/UL (ref 150–450)

## 2018-03-16 RX ADMIN — PREGABALIN SCH MG: 25 CAPSULE ORAL at 13:45

## 2018-03-16 RX ADMIN — LEVOTHYROXINE SODIUM SCH MG: 75 TABLET ORAL at 05:53

## 2018-03-16 RX ADMIN — PANTOPRAZOLE SODIUM SCH MG: 40 INJECTION, POWDER, FOR SOLUTION INTRAVENOUS at 08:07

## 2018-03-16 RX ADMIN — PREGABALIN SCH MG: 25 CAPSULE ORAL at 08:08

## 2018-03-16 RX ADMIN — BENZOCAINE AND MENTHOL PRN EACH: 15; 3.6 LOZENGE ORAL at 07:12

## 2018-03-16 RX ADMIN — ZOLPIDEM TARTRATE SCH MG: 5 TABLET, FILM COATED ORAL at 21:08

## 2018-03-16 RX ADMIN — PREGABALIN SCH MG: 25 CAPSULE ORAL at 21:04

## 2018-03-16 RX ADMIN — ENOXAPARIN SODIUM SCH MG: 100 INJECTION SUBCUTANEOUS at 08:07

## 2018-03-16 RX ADMIN — BENZOCAINE AND MENTHOL PRN EACH: 15; 3.6 LOZENGE ORAL at 10:49

## 2018-03-16 RX ADMIN — DEXTROSE MONOHYDRATE, SODIUM CHLORIDE, AND POTASSIUM CHLORIDE SCH MLS/HR: 50; 4.5; 1.49 INJECTION, SOLUTION INTRAVENOUS at 08:06

## 2018-03-16 RX ADMIN — AMLODIPINE BESYLATE SCH MG: 5 TABLET ORAL at 21:04

## 2018-03-16 RX ADMIN — BENZOCAINE AND MENTHOL PRN EACH: 15; 3.6 LOZENGE ORAL at 16:34

## 2018-03-16 RX ADMIN — DEXTROSE MONOHYDRATE, SODIUM CHLORIDE, AND POTASSIUM CHLORIDE SCH MLS/HR: 50; 4.5; 1.49 INJECTION, SOLUTION INTRAVENOUS at 21:03

## 2018-03-16 RX ADMIN — KETOROLAC TROMETHAMINE PRN MG: 30 INJECTION, SOLUTION INTRAMUSCULAR; INTRAVENOUS at 21:25

## 2018-03-16 RX ADMIN — THIAMINE HYDROCHLORIDE PRN MLS/HR: 100 INJECTION, SOLUTION INTRAMUSCULAR; INTRAVENOUS at 04:05

## 2018-03-16 NOTE — RADIOLOGY IMAGING REPORT
FACILITY: [facility] - [place] 

 

PATIENT NAME: Vivienne Arrington

: 1930

MR: [id number]

V: [id number]

EXAM DATE: 

ORDERING PHYSICIAN: JOHN ULLRICH

TECHNOLOGIST: 

 

Location: [facility]

Patient: Vivienne Arrington

: 1930

MRN: [id number]

Visit/Account:[id number]

Date of Sevice:  3/15/2018

 

ACCESSION #: [id number]

 

Small bowel series

 

Indication: Small bowel obstruction

 

Comparison: KUB dated March 15, 2018. CT dated 2018.

 

Findings:

 

Small bowel series was performed after patient ingested Gastrografin solution without difficulty. Mul
tiple overhead images of the abdomen were obtained at various time points. Immediate images demonstra
te prominence of the duodenal measuring up to 3.9 cm. There may be a small amount of contrast within 
the colon on the 8 hour films. Most of the contrast is within the colon by the 15 hour film.

 

Nasogastric tube within the stomach. Mild degenerative changes within the lobar spine.

 

No fluoroscopic images were obtained.

 

IMPRESSION:

Dilation of proximal small bowel measuring up to 3.9 cm. The transit time is delayed however contrast
 reaches the colon by 15 hours.

 

Report Dictated By: Magdy Martinez MD at 3/16/2018 7:02 AM

 

Report E-Signed By: Magdy Martinez MD  at 3/16/2018 7:06 AM

 

WSN:[id number]

## 2018-03-16 NOTE — MEDICAL NUTRITION THERAPY
Nutrition Anthropometrics


Height (Inches):  65.50


Height (Calculated Centimeters:  166.262826


Weight (Pounds):  170


Weight (Calculated Kilograms):  77.281


BMI Calculated:  28.40


Hernando Nutrition Score:         Adequate 


Hernando Nutrition Risk Score:  20


Dietary Referral


Nutrition Risk Factors:      


Nutrition Risk Comment:





Nutritional Diagnosis


Nutritional Risk Acuity 1:  GI Obstruction


Past Medical History:  


hypothyroid, HTN, hyperlipidemia


Nutritional Acuity:  1-High


Nutrition Diagnosis:  Altered GI Function


Nutrition Etiology:  Physiological Causes


Nutrition Problem/Etiology/Sym:  


Altered Gastrointestinal (GI) Function related to alteration in


gastrointestinal tract function secondary to SBO AEB positive SBO


diagnosis with CT of the abdomen and abdominal pain, nausea prior to admit.


Energy Requirement:  1575 (M- StJ)


Protein Requirement:  77


Fluid Requirement:  1925


Diet Type:  NPO/Meds Only


Nutrition Intervention:  Nutrition support, Incr diet as tolerated





Nutritional Support


Recommended Enteral / Parental:  TPN


Recommended Rate:  75 ml/hr


Recommended Calories:  1836


Recommended Protein:  77





Nutrition Monitoring & Eval


RD Patient Assessment Time:  30 minutes


RD Assessment Type:  RD Re-Assessment


Patient Nutrition Acuity:  1-High


Follow Up Date:  Mar 19, 2018


Nutritional Comment:  


3/14 Pt admitted for SBO.  Alb 4.3.  NPO at present.  Follow clinical  


progression, labs, etc.  





3/16  Pt cont 4th day NPO.  Nursing reporting hypoactive bowel.  Recommend


nutr support unless diet advanced.  Will cont to monitor.











MAX CERVANTES Mar 16, 2018 13:11

## 2018-03-17 VITALS — SYSTOLIC BLOOD PRESSURE: 129 MMHG | DIASTOLIC BLOOD PRESSURE: 65 MMHG

## 2018-03-17 VITALS — SYSTOLIC BLOOD PRESSURE: 123 MMHG | DIASTOLIC BLOOD PRESSURE: 57 MMHG

## 2018-03-17 VITALS — SYSTOLIC BLOOD PRESSURE: 122 MMHG | DIASTOLIC BLOOD PRESSURE: 72 MMHG

## 2018-03-17 VITALS — DIASTOLIC BLOOD PRESSURE: 61 MMHG | SYSTOLIC BLOOD PRESSURE: 123 MMHG

## 2018-03-17 VITALS — DIASTOLIC BLOOD PRESSURE: 66 MMHG | SYSTOLIC BLOOD PRESSURE: 122 MMHG

## 2018-03-17 VITALS — SYSTOLIC BLOOD PRESSURE: 124 MMHG | DIASTOLIC BLOOD PRESSURE: 85 MMHG

## 2018-03-17 VITALS — DIASTOLIC BLOOD PRESSURE: 74 MMHG | SYSTOLIC BLOOD PRESSURE: 96 MMHG

## 2018-03-17 LAB — PLATELET COUNT, AUTOMATED: 164 K/UL (ref 150–450)

## 2018-03-17 RX ADMIN — PREGABALIN SCH MG: 25 CAPSULE ORAL at 09:18

## 2018-03-17 RX ADMIN — PANTOPRAZOLE SODIUM SCH MG: 40 INJECTION, POWDER, FOR SOLUTION INTRAVENOUS at 09:18

## 2018-03-17 RX ADMIN — ZOLPIDEM TARTRATE SCH MG: 5 TABLET, FILM COATED ORAL at 21:29

## 2018-03-17 RX ADMIN — KETOROLAC TROMETHAMINE PRN MG: 30 INJECTION, SOLUTION INTRAMUSCULAR; INTRAVENOUS at 21:29

## 2018-03-17 RX ADMIN — ACETAMINOPHEN PRN MLS/HR: 10 INJECTION, SOLUTION INTRAVENOUS at 13:25

## 2018-03-17 RX ADMIN — LEVOTHYROXINE SODIUM SCH MG: 75 TABLET ORAL at 05:45

## 2018-03-17 RX ADMIN — PREGABALIN SCH MG: 25 CAPSULE ORAL at 21:29

## 2018-03-17 RX ADMIN — AMLODIPINE BESYLATE SCH MG: 5 TABLET ORAL at 21:29

## 2018-03-17 RX ADMIN — PREGABALIN SCH MG: 25 CAPSULE ORAL at 16:35

## 2018-03-17 RX ADMIN — ENOXAPARIN SODIUM SCH MG: 100 INJECTION SUBCUTANEOUS at 09:18

## 2018-03-17 NOTE — RADIOLOGY IMAGING REPORT
FACILITY: [facility] - [place] 

 

PATIENT NAME: Vivienne Arrington

: 1930

MR: [id number]

V: [id number]

EXAM DATE: 

ORDERING PHYSICIAN: JOHN ULLRICH

TECHNOLOGIST: 

 

Location: [facility]

Patient: Vivienne Arrington

: 1930

MRN: [id number]

Visit/Account:[id number]

Date of Sevice:  3/17/2018

 

ACCESSION #: [id number]

 

KUB SINGLE VIEW ABDOMEN

 

HISTORY:  Small bowel obstruction.

 

COMPARISON: 3/15/2018.

 

FINDINGS: A single AP supine view of the abdomen is obtained.

 

Lines/tubes:  Enteric tube terminating in the distal stomach.

Bowel gas pattern:  No dilated air-filled loops of bowel, improved compared with 3/15/2018. Residual 
oral contrast material in the colon.

Soft tissues:  Negative.

Bony structures:  Negative.

 

IMPRESSION:

 

Interval resolution of dilated small bowel loops compared with 3/15/2018.

 

Report Dictated By: Douglas Mcgowan MD at 3/17/2018 6:33 AM

 

Report E-Signed By: Douglas Mcgowan MD  at 3/17/2018 6:34 AM

 

WSN:[id number]

## 2018-03-17 NOTE — GENERAL SURGERY PROGRESS NOTE
Subjective


Progress Notes


Subjective


I feel better


passing gas consistently


NGT output low


walking





Physical Exam





Vital Signs








  Date Time  Temp Pulse Resp B/P (MAP) Pulse Ox O2 Delivery O2 Flow Rate FiO2


 


3/17/18 07:15     95 Nasal Cannula 1.0 


 


3/17/18 07:15 98.1 70 16 129/65 (86)    








General Appearance:  Alert, Awake, No Acute Distress, Afebrile


ENT:  Moist Mucous Membranes


Cardiovascular:  Regular Rate and Rhythm


Respiratory:  No Respiratory Distress


GI:  Soft and Non-Tender, Other


Extremities:  Warm, Other (no edema)


Result Diagram:  


3/17/18 0548                                                                   

             3/17/18 0548





Monitor Interpretation:  Normal Sinus Rhythm (incomplete right bundle branch 

block)





Assessment and Plan


Problems:  


(1) Small bowel obstruction due to adhesions


Status:  Acute


Assessment & Plan:  3/13/18:  Admit, NPO, NG tube decompression, IV fluids.  

Will start with conservative therapy.  If she fails to improve over the next 2-

3 days, she may require surgical exploration to determine the cause of the SBO 

and address it surgically.





3/14/18:  Persistent SBO with nausea this morning.  Will reattempt NG placement 

today with topical analgesics.  May need to place endoscopically if unable to 

place NG tube at bedside.  Would like to make every effort to avoid surgery if 

possible in this 88yo female with numerous major abdominal surgeries.





3/14/18 (evening):  NG passed successfully this morning.  It has put out 650mL 

of bilious fluid.  No flatus yet.





3/15/18:  Passed some flatus but KUB with a slightly increased dilated small 

bowel in RUQ.  NG still with bilious output.  Will get water-soluble SBFT today.





3/17/18: Passing gas consistently. KUB shows reduction of small bowel 

distension. NGT output low. Labs oK. Remove NGT and start on clear liquid diet. 

Drop IVFs to 50cc/hr. Suppository. Continue walking. 





Central Venous Access


Medical Necessity for Access:  IV Access, Medication Administration


Time Spent:  < 30 min





Exam


Sepsis Risk:  No Definite Risk











GLADIS PAYNE MD Mar 17, 2018 08:49

## 2018-03-18 VITALS — DIASTOLIC BLOOD PRESSURE: 59 MMHG | SYSTOLIC BLOOD PRESSURE: 105 MMHG

## 2018-03-18 VITALS — SYSTOLIC BLOOD PRESSURE: 105 MMHG | DIASTOLIC BLOOD PRESSURE: 56 MMHG

## 2018-03-18 VITALS — DIASTOLIC BLOOD PRESSURE: 60 MMHG | SYSTOLIC BLOOD PRESSURE: 110 MMHG

## 2018-03-18 RX ADMIN — PANTOPRAZOLE SODIUM SCH MG: 40 INJECTION, POWDER, FOR SOLUTION INTRAVENOUS at 09:09

## 2018-03-18 RX ADMIN — LEVOTHYROXINE SODIUM SCH MG: 75 TABLET ORAL at 05:47

## 2018-03-18 RX ADMIN — PREGABALIN SCH MG: 25 CAPSULE ORAL at 09:09

## 2018-03-18 RX ADMIN — ENOXAPARIN SODIUM SCH MG: 100 INJECTION SUBCUTANEOUS at 09:09

## 2018-03-18 NOTE — GENERAL SURGERY PROGRESS NOTE
Subjective


Progress Notes


Subjective


"can I go home?"





Physical Exam





Vital Signs








  Date Time  Temp Pulse Resp B/P (MAP) Pulse Ox O2 Delivery O2 Flow Rate FiO2


 


3/18/18 06:59 98.4 69 16 110/60 (77) 89 Room Air  


 


3/17/18 07:15       1.0 








General Appearance:  Alert, Awake, No Acute Distress


Eyes:  PERRLA


ENT:  Moist Mucous Membranes


Cardiovascular:  Regular Rate and Rhythm


Respiratory:  No Respiratory Distress


GI:  Soft and Non-Tender


Extremities:  Soft and Non Tender


Result Diagram:  


3/17/18 0548                                                                   

             3/17/18 0548





Monitor Interpretation:  Normal Sinus Rhythm (incomplete right bundle branch 

block)





Assessment and Plan


Problems:  


(1) Small bowel obstruction due to adhesions


Status:  Acute


Assessment & Plan:  3/13/18:  Admit, NPO, NG tube decompression, IV fluids.  

Will start with conservative therapy.  If she fails to improve over the next 2-

3 days, she may require surgical exploration to determine the cause of the SBO 

and address it surgically.





3/14/18:  Persistent SBO with nausea this morning.  Will reattempt NG placement 

today with topical analgesics.  May need to place endoscopically if unable to 

place NG tube at bedside.  Would like to make every effort to avoid surgery if 

possible in this 86yo female with numerous major abdominal surgeries.





3/14/18 (evening):  NG passed successfully this morning.  It has put out 650mL 

of bilious fluid.  No flatus yet.





3/15/18:  Passed some flatus but KUB with a slightly increased dilated small 

bowel in RUQ.  NG still with bilious output.  Will get water-soluble SBFT today.





3/17/18: Passing gas consistently. KUB shows reduction of small bowel 

distension. NGT output low. Labs oK. Remove NGT and start on clear liquid diet. 

Drop IVFs to 50cc/hr. Suppository. Continue walking. 





3/18/18: Tolerating fulls. Feels good. Would like to go home. Plan to discharge 

after lunch. Advance to regular diet





Central Venous Access


Medical Necessity for Access:  IV Access, Medication Administration





Exam


Sepsis Risk:  No Definite Risk











GLADIS PAYNE MD Mar 18, 2018 09:11

## 2018-03-18 NOTE — HOSPITALIST DEPART
Discharge Summary


Reason for Hosp/Final Diag:  


(1) Small bowel obstruction due to adhesions


Status:  Acute


Hospital Course & Plan:  3/13/18:  Admit, NPO, NG tube decompression, IV 

fluids.  Will start with conservative therapy.  If she fails to improve over 

the next 2-3 days, she may require surgical exploration to determine the cause 

of the SBO and address it surgically.





3/14/18:  Persistent SBO with nausea this morning.  Will reattempt NG placement 

today with topical analgesics.  May need to place endoscopically if unable to 

place NG tube at bedside.  Would like to make every effort to avoid surgery if 

possible in this 86yo female with numerous major abdominal surgeries.





3/14/18 (evening):  NG passed successfully this morning.  It has put out 650mL 

of bilious fluid.  No flatus yet.





3/15/18:  Passed some flatus but KUB with a slightly increased dilated small 

bowel in RUQ.  NG still with bilious output.  Will get water-soluble SBFT today.





3/17/18: Passing gas consistently. KUB shows reduction of small bowel 

distension. NGT output low. Labs oK. Remove NGT and start on clear liquid diet. 

Drop IVFs to 50cc/hr. Suppository. Continue walking. 





3/18/18: Tolerating fulls. Feels good. Would like to go home. Plan to discharge 

after lunch. Advance to regular diet





Departure


Weight (Pounds):  170


Weight (Ounces):  6.0


Result Diagram:  


3/17/18 0548                                                                   

             3/17/18 0548





Condition:  Improved





Discharge Instructions


Home Meds


Reported Medications


Pregabalin (LYRICA) 100 Mg Capsule, 100 MG PO TID, CAPSULE


   3/13/18


Zolpidem Tartrate (AMBIEN) 5 Mg Tablet, 1 TAB PO QHS, TAB


   3/13/18


Amlodipine Besylate (AMLODIPINE BESYLATE) 5 Mg Tablet, 1 TAB PO QDAY, TAB


   3/13/18


Cyanocobalamin (Vitamin B-12) (Vitamin B12) 2,500 Mcg Tablet, 2000 UNITS INJ


   7/2/16


Levothyroxine Sodium (SYNTHROID) 75 Mcg Tablet, 75 MCG PO QDAY


   10/28/14


Discontinued Reported Medications


Methyldopa (METHYLDOPA) 500 Mg Tablet, 1000 MG PO BID


   10/1/15


Discontinued Scripts


Diazepam (DIAZEPAM) 5 Mg Tablet, 2.5-5 MG PO 2-3XD Y for DIZZINESS, #15 TAB


   Prov:SALVADOR CARRENO FNP         10/26/16


Ondansetron (ZOFRAN ODT) 4 Mg Tab.rapdis, 4 MG PO Q12H, #10 TAB


   TAKE 1 TABLET BY MOUTH EVERY 12 HOURS


   Prov:PURA DE LA FUENTE NP         7/13/16


Hydrocodone Bit/Acetaminophen (NORCO 5-325 TABLET) 1 Each Tablet, 1 EACH PO Q4-

6H Y for PAIN, #20 TAB


   Prov:SALVADOR CARRENO FN         7/2/16


Diet:  Regular


Activity:  As Tolerated, With Walker


Copies to:   ULLRICH,JOHN A MD





Venous Thromboembolism


VTE Risk


Physician Assess for VTE Risk:  Yes


Patient's VTE Risk:  Low





VTE Diagnostic Test


2 Days Prior to Admit:  No





Antithrombotics


Is Pt On Any Antithrombotics?:  Yes











GLADIS PAYNE MD Mar 18, 2018 09:14

## 2018-11-13 ENCOUNTER — HOSPITAL ENCOUNTER (OUTPATIENT)
Dept: HOSPITAL 89 - LAB | Age: 83
End: 2018-11-13
Attending: SURGERY
Payer: MEDICARE

## 2018-11-13 VITALS — BODY MASS INDEX: 28.4 KG/M2

## 2018-11-13 DIAGNOSIS — R19.7: Primary | ICD-10-CM

## 2018-11-13 PROCEDURE — 87045 FECES CULTURE AEROBIC BACT: CPT

## 2018-11-13 PROCEDURE — 87449 NOS EACH ORGANISM AG IA: CPT

## 2018-11-13 PROCEDURE — 87324 CLOSTRIDIUM AG IA: CPT

## 2018-11-13 PROCEDURE — 82705 FATS/LIPIDS FECES QUAL: CPT

## 2018-11-13 PROCEDURE — 83630 LACTOFERRIN FECAL (QUAL): CPT

## 2018-11-13 PROCEDURE — 87177 OVA AND PARASITES SMEARS: CPT

## 2018-11-13 PROCEDURE — 87205 SMEAR GRAM STAIN: CPT

## 2018-11-13 PROCEDURE — 82274 ASSAY TEST FOR BLOOD FECAL: CPT

## 2018-11-29 NOTE — PT INITIAL EVALUATION
MEDICAL DIAGNOSIS: RVQ/Flank pain

TREATMENT DIAGNOSIS: same

DATE OF ONSET: 11/28/14



SUBJECTIVE: Vivienne Arrington presents to physical therapy with complaints of 

RVQ/Flank  pain as a result of multiple surgeries in the last 3.5 to 4 years 

ago.  Furthermore, she states that she has adhesions against the abdominal wall 

that have become more painful in the recent months. She reports that the  pain 

has wrapped to the spine only a couple of times since the pain has  been getting

worse in the last few months. She reports increased RVQ/Flank  pain with bending

or rising. She reports that she does not typically feel  any pain with sitting, 

walking, and lying. She rates her current pain to  be 0/10. She reports normal 

bladder control. She denies any pain with  cough, sneezing, or straining. She 

denies any night pain, unexplained  weight loss, recent accidents, or any falls.

She reports that there is no  change with change of posture. Pain location is 

RVQ/Flank  and described as . Pain scale is 0 on a ten point pain scale.  



REHAB PROBLEM LIST: Increased Pain

Decreased ROM

Decreased Strength

Decreased Endurance

Decreased Balance

Decreased Function

Decreased ADL's

Decreased Mobility

Decreased Gait





PREVIOUS MEDICAL HISTORY: See EMR

OCCUPATION: Retired



OBJECTIVE: 



Posture: She demonstrates forward head position, increased thoracic kyphosis, 

and  decreased lumbar lordosis. 



ROM: Trunk AROM: flexion: NIL with normal end feel. extension: minimal  

restriction with muscular end feel. side gliding R: NIL with normal end  feel. 

side gliding L: NIL with normal end feel. 



Strength: Core: 4-/5. B LE's: 4+/5



Palpation: TTP: along healed incision on R  along with adhesions



Special Tests: Repeated flexion: stretch during the test and same following the 

test.  Repeated extension: stretch during the test and same following the test. 



Mobility: Modified Independent



Gait: She demonstrated increased base of support, decreased velocity, decreased 

B UE movement, decreased B step lengths, decreased B step clearance, and  no 

LOB's. 



ASSESSMENT: Vivienne will benefit from skilled physical therapy addressing the 

listed  impairments to improve function and return to prior level of function. 



Short Term Goals

6 weeks: Pt will demonstrate decreased adhesions along her incisional line  on 

the R flank to improve function and QOL. 

6 weeks: Pt will demonstrate abolished R flank pain and return to prior  level 

of function to improve function and QOL. 



Patient's Goals

decrease pain with her scar adhesions



PLAN:  Patient to be seen for Manual Therapy/STM/MET

Strengthening/condition

Range of Motion

Spinal Stabilization

Work Hardening/Cond

Stretching

Closed Chain Program

Posture/Body mechanics

Gait Trg/Balance Trg

Home Exercise Program

Therapeutic Activities



2x/Week for 6 Weeks



If you have any questions, comments, or concerns about this report or plan, 

please contact me at [phone].



Thank you, 



Siddhartha Diana, PT, DPT 

[facility]

## 2019-01-03 ENCOUNTER — HOSPITAL ENCOUNTER (OUTPATIENT)
Dept: HOSPITAL 89 - PT | Age: 84
Discharge: HOME | End: 2019-01-03
Attending: SURGERY
Payer: MEDICARE

## 2019-01-03 VITALS — BODY MASS INDEX: 28.4 KG/M2

## 2019-01-03 DIAGNOSIS — R10.11: Primary | ICD-10-CM

## 2019-01-03 DIAGNOSIS — R10.9: ICD-10-CM

## 2019-01-03 PROCEDURE — 97162 PT EVAL MOD COMPLEX 30 MIN: CPT

## 2019-01-03 NOTE — PT PLAN OF CARE
Physician: John Ullrich, MD

Patient is being seen: 2x/week         Therapist: Siddhartha Diana, PT, DPT 

Medical Diagnosis: RVQ/Flank pain

Treatment Diagnosis: same

Date of Onset: 11/28/14         Date of Initial Evaluation: 11/28/18

Date patient was last seen: 01/03/19

Number of treatments: 10         Number of cancellations/No shows: 0



INTERVENTIONS:

Manual Therapy/STM/MET

Strengthening/condition

Range of Motion

Spinal Stabilization

Work Hardening/Cond

Stretching

Closed Chain Program

Posture/Body mechanics

Gait Trg/Balance Trg

Home Exercise Program

Therapeutic Activities





GOALS:

6 weeks: Pt will demonstrate decreased adhesions along her incisional line  on 

the R flank to improve function and QOL. MET 

6 weeks: Pt will demonstrate abolished R flank pain and return to prior  level 

of function to improve function and QOL. MET 



PATIENT'S GOAL:

decrease pain with her scar adhesions



Status of Patient's Goals: MET 



Patient Compliance:  Good         Prognosis: Good



Reasons for continuing therapy: This is a a discharge note for Vivienne Arrington. 

She reports that she is doing much better. She reports that she will 

occasionally feel a sharp pain for less than 30 seconds when bending forward. 

She reports that she no longer feels any pain with eating or digestion, which 

she states is a huge improvement.She demonstrates increased accessory mobility 

around her incision, decreased scar tissue adhesions, decreased sensitivity, and

independent with her program moving forward. She feels confident in her program 

and feels like it is time to discharge. As a result, she will be discharged from

PT to [facility]. 



Posture: She demonstrates forward head position, increased thoracic kyphosis, 

and  decreased lumbar lordosis. 

ROM: Trunk AROM: flexion: NIL with normal end feel. extension: minimal  

restriction with muscular end feel. side gliding R: NIL with normal end  feel. 

side gliding L: NIL with normal end feel. 

Strength: Core: 4-/5. B LE's: 4+/5

Special Tests: Repeated flexion: stretch during the test and same following the 

test.  Repeated extension: stretch during the test and same following the test. 

Mobility: Modified Independent



If you have any questions, please contact me at . 



Thank you, 



Siddhartha Diana, PT, DPT 

[facility]

## 2019-01-29 ENCOUNTER — HOSPITAL ENCOUNTER (OUTPATIENT)
Dept: HOSPITAL 89 - CT | Age: 84
End: 2019-01-29
Attending: SURGERY
Payer: MEDICARE

## 2019-01-29 VITALS — BODY MASS INDEX: 28.4 KG/M2

## 2019-01-29 DIAGNOSIS — K44.9: Primary | ICD-10-CM

## 2019-01-29 PROCEDURE — 36415 COLL VENOUS BLD VENIPUNCTURE: CPT

## 2019-01-29 PROCEDURE — 74245: CPT

## 2019-01-29 PROCEDURE — 82565 ASSAY OF CREATININE: CPT

## 2019-01-29 PROCEDURE — 74177 CT ABD & PELVIS W/CONTRAST: CPT

## 2019-01-29 NOTE — RADIOLOGY IMAGING REPORT
FACILITY: [facility] 

 

PATIENT NAME: Vivienne Arrington

: 1930

MR: [id number]

V: [id number]

EXAM DATE: 

ORDERING PHYSICIAN: JOHN ULLRICH

TECHNOLOGIST: 

 

[place] [facility] [place]

Patient: Vivienne Arrington

: 1930

MRN: [id number]

Visit/Account:[id number]

Date of Sevice:  2019

 

ACCESSION #: [id number]

 

CT ABDOMEN PELVIS W/ CON

 

HISTORY:  Diffuse abdomen pain

 

TECHNIQUE: Following administration of IV contrast contiguous axial images acquired through the abdom
en/pelvis.  Coronal and sagittal reformatting also performed.Dose Lowering Technique

 

One of the following dose optimization techniques was utilized in the performance of this exam: Autom
ated exposure control; adjustment of the mA and/or kV according to the patient's size; or use of an i
terative  reconstruction technique.  Specific details can be referenced in the facility's radiology C
T exam operational policy.

 

CONTRAST:  75 mL Isovue-370

 

COMPARISON:  2018

 

FINDINGS:

 

Visualized lung bases:  There is minimal linear scarring in the lung bases

 

Hepatobiliary:  There postsurgical changes from a cholecystectomy

 

Spleen:  Negative.

 

Adrenals:  Negative.

 

Pancreas:  There is fatty replacement of the head the pancreas similar to the prior study

 

Kidneys ureters or bladder: Moderate perinephric stranding bilaterally.  There is a 3.5 cm lower pole
 right renal cyst.  .  Parapelvic cyst inferior left kidney

 

Genitalia:  The uterus is either severely atrophic or surgically absent

 

GI: There Is an anastomosis in the sigmoid colon.  There are several scattered diverticula in the lef
t-sided colon although no CT evidence of acute diverticulitis.  There is an additional colonic enteri
c anastomosis in the right lower quadrant.  There is no evidence of bowel obstruction.  There is a sm
all hiatal hernia

 

Vessels/spaces/nodes:  There extensive atherosclerotic calcifications throughout the abdominal aorta 
and branch vessels.  There is moderate narrowing at the origins of both renal arteries.  Curvilinear 
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
ring 8 mm in diameter

 

Bones/soft tissues:  There is a tiny umbilical hernia containing fat.  There are spondylotic changes 
lumbar spine

 

Additional findings:  None pertinent.

 

IMPRESSION:

 

Postsurgical changes of the large and small bowel as described with no evidence of a bowel obstructio
n at this time

 

Postsurgical changes from a cholecystectomy and probable hysterectomy

 

Small hiatal hernia

 

Extensive atherosclerotic calcifications as described

 

Additional chronic findings

 

Report Dictated By: Yakelin Tian MD at 2019 11:23 AM

 

Report E-Signed By: Yakelin Tian MD  at 2019 11:34 AM

 

MARV:SERA

## 2019-01-29 NOTE — RADIOLOGY IMAGING REPORT
FACILITY: [facility] 

 

PATIENT NAME: Vivienne Arrington

: 1930

MR: [id number]

V: [id number]

EXAM DATE: [id number]

ORDERING PHYSICIAN: JOHN ULLRICH

TECHNOLOGIST: 

 

[place] [facility] [place]: Vivienne Arrington

: 1930

MRN: [id number]

Visit/Account:[id number]

Date of Sevice:  2019

 

ACCESSION #: [id number]

 

Exam type: XR UPPER GI & SM BOWEL

 

History: abd pain, diarrhea difficulty swallowing, past history of bowel obstruction

 

Comparison: CT abdomen pelvis performed today.

 

Findings:

 

Pulmonary  film the abdomen demonstrates contrast in the nondistended renal collecting systems u
reters and bladder from today's CT scan

 

Double contrast upper GI series was performed with thick and thin barium.  There is a small hiatal he
rnia with mild narrowing at the lower esophageal sphincter.  No abnormality of the stomach duodenal b
ulb or duodenal C-loop was seen.  The patient received additional barium orally and the barium was fo
llowed throughout the small bowel to the right-sided the colon.  It took 15 minutes from the patient'
s completion of the second dose of thin barium to the visualization of the hepatic flexure.  There is
 no evidence of bowel obstruction.  No mucosal abnormality no extrinsic mass effect.  The fluoroscopy
 dose area product was 1148.23 micro-Gray per meter squared

 

IMPRESSION:

 

1.  Small hiatal hernia with mild narrowing at the GE junction.

 

The remainder of the stomach and small bowel series was unremarkable

 

Report Dictated By: Yakelin Tian MD at 2019 3:51 PM

 

Report E-Signed By: Yakelin Tian MD  at 2019 3:56 PM

 

WSN:[id number]